# Patient Record
Sex: MALE | Race: WHITE | Employment: FULL TIME | URBAN - METROPOLITAN AREA
[De-identification: names, ages, dates, MRNs, and addresses within clinical notes are randomized per-mention and may not be internally consistent; named-entity substitution may affect disease eponyms.]

---

## 2019-11-22 ENCOUNTER — APPOINTMENT (OUTPATIENT)
Dept: PHYSICAL THERAPY | Age: 59
End: 2019-11-22

## 2019-11-25 ENCOUNTER — HOSPITAL ENCOUNTER (OUTPATIENT)
Dept: PHYSICAL THERAPY | Age: 59
Discharge: HOME | End: 2019-11-25
Payer: COMMERCIAL

## 2019-11-25 PROCEDURE — 97110 THERAPEUTIC EXERCISES: CPT

## 2019-11-25 PROCEDURE — 97140 MANUAL THERAPY 1/> REGIONS: CPT

## 2019-11-25 PROCEDURE — 97161 PT EVAL LOW COMPLEX 20 MIN: CPT

## 2019-11-25 NOTE — PROGRESS NOTES
PHYSICAL THERAPY - DAILY TREATMENT NOTE    Patient Name: Verner Crofts        Date: 2019  : 1960   yes Patient  Verified  Visit #:   1     Insurance: Payor: Jermaine Rollins / Plan: [id number] / Product Type: HMO /      In time: 4:08 Out time: 5:00   Total Treatment Time: 52     Medicare/[employer] Time Tracking (below)   Total Timed Codes (min):  n/a 1:1 Treatment Time:  n/a     TREATMENT AREA =  Neck pain [M54.2]    SUBJECTIVE  Pain Level (on 0 to 10 scale):  4  / 10   Medication Changes/New allergies or changes in medical history, any new surgeries or procedures?    no  If yes, update Summary List   Subjective Functional Status/Changes:  []  No changes reported     See POC          OBJECTIVE    See exam on chart for details on objective findings          12 min Therapeutic Exercise:  [x]  See flow sheet   Rationale:      increase ROM and increase strength to improve the patients ability to change/maintain body positions for driving     15 min Manual Therapy: STM and TpR to L lats, teres minor, MT and c/s paraspinals/scalenes. Rationale:      decrease pain, increase ROM and increase tissue extensibility to improve patient's ability to change/maintain body positions for driving        Billed With/As:   [x] TE   [] TA   [] Neuro   [] Self Care Patient Education: [x] Review HEP    [] Progressed/Changed HEP based on:   [] positioning   [] body mechanics   [] transfers   [] heat/ice application    [x] other: pt ed on neutral sitting/standing head posture with postural corrections throughout the day with sx's.        Other Objective/Functional Measures:    See POC     Post Treatment Pain Level (on 0 to 10) scale:   3  / 10     ASSESSMENT  Assessment/Changes in Function:     See POC     []  See Progress Note/Recertification   Patient will continue to benefit from skilled PT services to modify and progress therapeutic interventions, address functional mobility deficits, address ROM deficits, address strength deficits, analyze and address soft tissue restrictions, analyze and cue movement patterns, analyze and modify body mechanics/ergonomics, assess and modify postural abnormalities and instruct in home and community integration to attain remaining goals. Progress toward goals / Updated goals:    See POC     PLAN  []  Upgrade activities as tolerated yes Continue plan of care   []  Discharge due to :    []  Other:      Therapist: Valentino Alejandra.  Garcia Mojica PT    Date: 11/25/2019 Time: 11:29 AM     Future Appointments   Date Time Provider Juan Franco   12/9/2019  6:00 PM Jana MCMILLAN PT [facility]   12/10/2019  8:30 AM Fany Caballero, PTA [facility]   12/16/2019  4:15 PM Jana MCMILLAN PT [facility]   12/18/2019  4:15 PM Fany Caballero, PTA [facility]   12/23/2019  4:30 PM Fany Caballero, PTA [facility]

## 2019-11-25 NOTE — PROGRESS NOTES
[facility] [place] [address], [place], [address]  Phone: [phone]   [phone]  PLAN OF CARE / [address] [place] [address] PHYSICAL THERAPY SERVICES  Patient Name: Jian Tyler : 1960   Medical   Diagnosis: Neck pain [M54.2] Treatment Diagnosis: Neck pain [M54.2]   Onset Date: 2 months     Referral Source: Gisselle Butcher MD Start of Care [facility]): 2019   Prior Hospitalization: See medical history Provider #: [id number]   Prior Level of Function: Unlimited tolerance for sitting, driving and desk work   Comorbidities: PMH leukemia (25 years ago), depression   Medications: Verified on Patient Summary List   The Plan of Care and following information is based on the information from the initial evaluation.   ===========================================================================================  Assessment / key information:  Pt is a 64y.o. year old RHD male with subjective complaints of L shoulder/neck pain with insidious onset; although started around the time of receiving a flu shot. Pain has stayed the same since onset. Current pain is rated as 0 to 7/10; localized to posterior shoulder blade on left and radiating laterally to posterior upper arm to elbow level at worse. Worse with sitting, desk work, driving. Denies red flags. Pt has recent xrays (+) DDD and mild anterolisthesis C5-C6. FOTO score= 51/100 indicating 49% impairment to functional activities. Today's evaulation is significant for   1) POSTURE/OBSERVATION: flattened c/s lordosis. 2) ROM c/s grossly WNL all planes with p! Reproduction in R SB'ing. UE ROM grossly WNL except L shoulder IR limited to 48 degrees and also reproduces pain c/o.  3) STRENGTH:  RUE grossly 5/5, L shoulder grossly 4+/5 with fatigue/tremors noted to testing;  Shoulder ER reproduces posterior shoulder pain c/o; wrist/ strength 5/5.   4) SPECIAL TESTS: cervical compression alleviates pain, cervical traction increases pain. (+) L median ULTT . 5) ADDITIONAL FINDINGS: TrP's and ttp to L Lats, teres minor, MT and b/l c/s paraspinals and scalenes. These findings are supportive for diagnosis of cervical radiculopathy. Pt will be a good candidate for skilled PT to address these impairments and promote return to normal ADLs and functional mobility for improved quality of life.    ===========================================================================================  Eval Complexity: History MEDIUM  Complexity : 1-2 comorbidities / personal factors will impact the outcome/ POC ;  Examination  MEDIUM Complexity : 3 Standardized tests and measures addressing body structure, function, activity limitation and / or participation in recreation ; Presentation LOW Complexity : Stable, uncomplicated ;  Decision Making MEDIUM Complexity : FOTO score of 26-74; Overall Complexity LOW   Problem List: pain affecting function, decrease ROM, decrease strength, decrease ADL/ functional abilitiies, decrease activity tolerance and decrease flexibility/ joint mobility   Treatment Plan may include any combination of the following: Therapeutic exercise, Therapeutic activities, Neuromuscular re-education, Physical agent/modality, Manual therapy, Patient education and Self Care training  Patient / Family readiness to learn indicated by: asking questions, trying to perform skills and interest  Persons(s) to be included in education: patient (P)  Barriers to Learning/Limitations: None  Measures taken, if barriers to learning:    Patient Goal (s): \"be able to drive eight hours without pain\"   Patient self reported health status: good  Rehabilitation Potential: good   Short Term Goals: To be accomplished in  4  treatments:  1. Pt will be educated in appropriate HEP to decrease pain, increase ROM, increase strength and return pt to PLOF.     2. Pt will report at least 25% improvement in frequency/intensity of left UE radicular symptoms with ADL's.   3. Pt will note pain less than or equal to 5/10 at worst to allow increase in functional abilities.  Long Term Goals: To be accomplished in  8-12  treatments:  1. Pt will improve FOTO score to >/= 67 to demo a significant improvement in functional activity tolerance. 2. Pt will achieve >/= +5 GROC in order to promote increased activity tolerance. 3. Pt will demonstrate c/s AROM WNL and pain free to promote improved driving tolerance. 4. Patient will report at least 75% functional improvement with tolerance for desk work. Frequency / Duration:   Patient to be seen  1-2  times per week for 8-12  treatments:  Patient / Caregiver education and instruction: self care, activity modification and exercises  Therapist Signature: Ender Solorio, PT Date: [id number]   Certification Period: n/a Time: 4:51 PM   ===========================================================================================  I certify that the above Physical Therapy Services are being furnished while the patient is under my care. I agree with the treatment plan and certify that this therapy is necessary. Physician Signature:        Date:       Time:     Please sign and return to [facility] or you may fax the signed copy to [phone]. Thank you.

## 2019-12-09 ENCOUNTER — APPOINTMENT (OUTPATIENT)
Dept: PHYSICAL THERAPY | Age: 59
End: 2019-12-09
Payer: COMMERCIAL

## 2019-12-10 ENCOUNTER — HOSPITAL ENCOUNTER (OUTPATIENT)
Dept: PHYSICAL THERAPY | Age: 59
Discharge: HOME | End: 2019-12-10
Payer: COMMERCIAL

## 2019-12-10 PROCEDURE — 97140 MANUAL THERAPY 1/> REGIONS: CPT

## 2019-12-10 PROCEDURE — 97110 THERAPEUTIC EXERCISES: CPT

## 2019-12-10 NOTE — PROGRESS NOTES
PT DAILY TREATMENT NOTE     Patient Name: Carlos Dewitt  Date:12/10/2019  : 1960  [x]  Patient  Verified  Payor: Magdiel Brown / Plan: [employer] / Product Type: HMO /    In time:8:31  Out time:9:23  Total Treatment Time (min): 52  Total Timed Codes (min): 49  1:1 Treatment Time (min):    Visit #: 2 of     Treatment Area: Neck pain [M54.2]    SUBJECTIVE  Pain Level (0-10 scale): 2/10  Any medication changes, allergies to medications, adverse drug reactions, diagnosis change, or new procedure performed?: [x] No    [] Yes (see summary sheet for update)  Subjective functional status/changes:   [] No changes reported  I usually feel the pain from my neck that goes down the back of my arm to my elbow and it gets worse the longer that I drive as well as using the computer. OBJECTIVE      40 min Therapeutic Exercise:  [x] See flow sheet :   Rationale: increase ROM and increase strength to improve the patients ability to improve functional abilities    12 min Manual Therapy:  SOR and Instrument assisted soft tissue mobilization applied to left cervical musculature using GT-3,4&6   Rationale: decrease pain, increase ROM, increase tissue extensibility and decrease trigger points to improve functional mobility         min Patient Education: [x] Review HEP    [] Progressed/Changed HEP based on:   [] positioning   [] body mechanics   [] transfers   [] heat/ice application        Other Objective/Functional Measures:     Pain Level (0-10 scale) post treatment: 210    ASSESSMENT/Changes in Function: Pt tolerated all new therex fairly well upon trial today except for UBE which exacerbated left UE radicular symptoms which was halted at that point. Pt presented with chief c/o increased left UE radicular symptoms down posterior triceps region to elbow especially increased with prolonged sitting combined with reaching ADL's.  Pt did not demonstrate any apparent directional preference during treatment today.Will continue to progress/advance patient within current POC as tolerated with monitoring symptoms. Patient will continue to benefit from skilled PT services to modify and progress therapeutic interventions, address functional mobility deficits, address ROM deficits, address strength deficits, analyze and address soft tissue restrictions, analyze and cue movement patterns, analyze and modify body mechanics/ergonomics and assess and modify postural abnormalities to attain remaining goals. []  See Plan of Care  []  See progress note/recertification  []  See Discharge Summary         Progress towards goals / Updated goals: · Short Term Goals: To be accomplished in  4  treatments:  1. Pt will be educated in appropriate HEP to decrease pain, increase ROM, increase strength and return pt to PLOF.12/10/19: Met  2. Pt will report at least 25% improvement in frequency/intensity of left UE radicular symptoms with ADL's.   3. Pt will note pain less than or equal to 5/10 at worst to allow increase in functional abilities.      · Long Term Goals: To be accomplished in  8-12  treatments:  1. Pt will improve FOTO score to >/= 67 to demo a significant improvement in functional activity tolerance. 2. Pt will achieve >/= +5 GROC in order to promote increased activity tolerance. 3. Pt will demonstrate c/s AROM WNL and pain free to promote improved driving tolerance. 4. Patient will report at least 75% functional improvement with tolerance for desk work.     PLAN  [x]  Upgrade activities as tolerated     []  Continue plan of care  []  Update interventions per flow sheet       []  Discharge due to:_  []  Other:_      Rossy Manning PTA 12/10/2019  8:37 AM      Future Appointments   Date Time Provider Juan Franco   12/16/2019  4:15 PM Dany West, PT [facility]   12/18/2019  4:15 PM Fany Caballero PTA [facility]   12/23/2019  4:30 PM Fany Caballero PTA [facility]

## 2019-12-16 ENCOUNTER — HOSPITAL ENCOUNTER (OUTPATIENT)
Dept: PHYSICAL THERAPY | Age: 59
Discharge: HOME | End: 2019-12-16
Payer: COMMERCIAL

## 2019-12-16 PROCEDURE — 97140 MANUAL THERAPY 1/> REGIONS: CPT

## 2019-12-16 PROCEDURE — 97110 THERAPEUTIC EXERCISES: CPT

## 2019-12-16 NOTE — PROGRESS NOTES
PHYSICAL THERAPY - DAILY TREATMENT NOTE    Patient Name: Sakina Vieira        Date: 2019  : 1960   yes Patient  Verified  Visit #:   3   of     Insurance: Payor: Celestino Thomason / Plan: [id number] H[employer] / Product Type: HMO /      In time: 4:21 Out time: 5:01   Total Treatment Time: 40     Medicare/[facility] Time Tracking (below)   Total Timed Codes (min):  na 1:1 Treatment Time:  na     TREATMENT AREA =  Neck pain [M54.2]    SUBJECTIVE  Pain Level (on 0 to 10 scale):  1  / 10   Medication Changes/New allergies or changes in medical history, any new surgeries or procedures?    no  If yes, update Summary List   Subjective Functional Status/Changes:  []  No changes reported     Pt reports sx only arise when he is driving, do not extend below elbow. Reports having ~2 day relief after last visit. OBJECTIVE    25 min Therapeutic Exercise:  [x]  See flow sheet   Rationale:      increase ROM and increase strength to improve the patients ability to drive     15 min Manual Therapy: pec minor release, DTM to the L infraspinatus, scalenes, rhomboids; median nerve glide L   Rationale:      decrease pain, increase ROM, increase tissue extensibility and decrease trigger points to improve patient's ability to complete ADLs      Billed With/As:   [x] TE   [] TA   [] Neuro   [] Self Care Patient Education: [x] Review HEP    [] Progressed/Changed HEP based on:   [] positioning   [] body mechanics   [] transfers   [] heat/ice application    [] other:      Other Objective/Functional Measures:    Reproduction of pt's L UE sx w/ UBE retro, after 8 reps of seated c/s retraction, and TB rows  Sig ttp to the L post cuff, (+) referral to L posterolateral UE  Inc pain w/ attempts at standing shoulder abd; performed 3 reps then discontinued, w/ residual L scapular and posterolateral arm pain following.  Pt declined modalities     Post Treatment Pain Level (on 0 to 10) scale:   4  / 10     ASSESSMENT  Assessment/Changes in Function:     Reproduction of UE sx w/ cervical retraction, significant fatigue/tremors noted w/ L post cuff strengthening, suggest possible underlying L RC involvement. Will continue to progress/modify as needed to further improve upon UE strength and pain modulation to improve ADL tolerance     []  See Progress Note/Recertification   Patient will continue to benefit from skilled PT services to modify and progress therapeutic interventions, address functional mobility deficits, address ROM deficits, address strength deficits, analyze and address soft tissue restrictions, analyze and cue movement patterns, analyze and modify body mechanics/ergonomics, assess and modify postural abnormalities and instruct in home and community integration to attain remaining goals. Progress toward goals / Updated goals: · Short Term Goals: To be accomplished in  4  treatments:  1.  Pt will be educated in appropriate HEP to decrease pain, increase ROM, increase strength and return pt to PLOF.12/10/19: Met  2. Pt will report at least 25% improvement in frequency/intensity of left UE radicular symptoms with ADL's.   3. Pt will note pain less than or equal to 5/10 at worst to allow increase in functional abilities.       · Long Term Goals: To be accomplished in  8-12  treatments:  1. Pt will improve FOTO score to >/= 67 to demo a significant improvement in functional activity tolerance. 2. Pt will achieve >/= +5 GROC in order to promote increased activity tolerance. 3. Pt will demonstrate c/s AROM WNL and pain free to promote improved driving tolerance. 4. Patient will report at least 75% functional improvement with tolerance for desk work.      PLAN  []  Upgrade activities as tolerated yes Continue plan of care   []  Discharge due to :    []  Other:      Therapist: Elder Carrasco PT    Date: 12/16/2019 Time: 4:51 PM     Future Appointments   Date Time Provider Juan Franco   12/18/2019  4:15 PM [facility]   12/23/2019  4:30 PM Stacie Yang, [place]O [facility]

## 2019-12-18 ENCOUNTER — HOSPITAL ENCOUNTER (OUTPATIENT)
Dept: PHYSICAL THERAPY | Age: 59
Discharge: HOME | End: 2019-12-18
Payer: COMMERCIAL

## 2019-12-18 PROCEDURE — 97110 THERAPEUTIC EXERCISES: CPT

## 2019-12-18 PROCEDURE — 97140 MANUAL THERAPY 1/> REGIONS: CPT

## 2019-12-18 NOTE — PROGRESS NOTES
PT DAILY TREATMENT NOTE     Patient Name: Loco Gerber  Date:2019  : 1960  [x]  Patient  Verified  Payor: [facility] / Plan: [id number] [place] HMO / Product Type: HMO /    In time:4:15  Out time:5:08  Total Treatment Time (min): 53  Total Timed Codes (min): 48  1:1 Treatment Time (min):    Visit #: 4 of     Treatment Area: Neck pain [M54.2]    SUBJECTIVE  Pain Level (0-10 scale): 2/10  Any medication changes, allergies to medications, adverse drug reactions, diagnosis change, or new procedure performed?: [x] No    [] Yes (see summary sheet for update)  Subjective functional status/changes:   [] No changes reported  I still get the pain and tingling down to my elbow after about 10-15 minutes of driving, but it has been feeling pretty good for a little while after I finish the therapy sessions. OBJECTIVE      40 min Therapeutic Exercise:  [x] See flow sheet :   Rationale: increase ROM and increase strength to improve the patients ability to improve functional abilities     13 min Manual Therapy:  SOR/manual traction and Instrument assisted soft tissue mobilization applied to left cervical and medial scapular border musculature using GT-1&3   Rationale: decrease pain, increase ROM, increase tissue extensibility and decrease trigger points to improve functional mobility        min Patient Education: [x] Review HEP    [] Progressed/Changed HEP based on:   [] positioning   [] body mechanics   [] transfers   [] heat/ice application        Other Objective/Functional Measures:     Pain Level (0-10 scale) post treatment: 0    ASSESSMENT/Changes in Function: Pt presented with increased relief of left cervical and proximal UE pain/symptoms with trial with manual SOR/manual traction and repeated left side bending followed by cervical retraction suggesting possible disc derangement.  Pt was also able to advance to addition of theraband lower trap walkouts and corkscrew stretches with min to mod challenge today.Will continue to progress/advance patient within current POC as tolerated with monitoring symptoms. Patient will continue to benefit from skilled PT services to modify and progress therapeutic interventions, address functional mobility deficits, address ROM deficits, address strength deficits, analyze and address soft tissue restrictions, analyze and cue movement patterns, analyze and modify body mechanics/ergonomics and assess and modify postural abnormalities to attain remaining goals. []  See Plan of Care  []  See progress note/recertification  []  See Discharge Summary         Progress towards goals / Updated goals: · Short Term Goals: To be accomplished in  4  treatments:  1.  Pt will be educated in appropriate HEP to decrease pain, increase ROM, increase strength and return pt to PLOF.12/10/19: Met  2. Pt will report at least 25% improvement in frequency/intensity of left UE radicular symptoms with ADL's.   3. Pt will note pain less than or equal to 5/10 at worst to allow increase in functional abilities.       · Long Term Goals: To be accomplished in  8-12  treatments:  1. Pt will improve FOTO score to >/= 67 to demo a significant improvement in functional activity tolerance. 2. Pt will achieve >/= +5 GROC in order to promote increased activity tolerance. 3. Pt will demonstrate c/s AROM WNL and pain free to promote improved driving tolerance. 4. Patient will report at least 75% functional improvement with tolerance for desk work.     PLAN  [x]  Upgrade activities as tolerated     []  Continue plan of care  []  Update interventions per flow sheet       []  Discharge due to:_  []  Other:_      Alston Lesches, PTA 12/18/2019  4:13 PM      Future Appointments   Date Time Provider Juan Franco   12/18/2019  4:15 PM Andreas Vaca [address] [facility]   12/23/2019  4:30 PM Jarrod Garcia PTA [facility]

## 2019-12-23 ENCOUNTER — HOSPITAL ENCOUNTER (OUTPATIENT)
Dept: PHYSICAL THERAPY | Age: 59
Discharge: HOME | End: 2019-12-23
Payer: COMMERCIAL

## 2019-12-23 PROCEDURE — 97110 THERAPEUTIC EXERCISES: CPT

## 2019-12-23 PROCEDURE — 97140 MANUAL THERAPY 1/> REGIONS: CPT

## 2019-12-23 NOTE — PROGRESS NOTES
[address] [facility] AT [place] [address], [place], [address]  Phone: [phone]   [phone]  PROGRESS NOTE  Patient Name: Katiana Hernandez : 1960   Treatment/Medical Diagnosis: Neck pain [M54.2]   Referral Source: Prashant Haley MD     Date of Initial Visit: 19 Attended Visits: 5 Missed Visits: 0     SUMMARY OF TREATMENT  Therapeutic exercise for cervical mobility, postural awareness/strengthening, cervicothoracic stabilization, manual intervention, moist heat and HEP. CURRENT STATUS  Patient reports approximately 20% overall improvement from therapy since initial evaluation with 3/10 pain level on average, increased to 7/10 at the worst with prolonged driving > 53-48 minutes. Pt is making slow but steady progress with gaining cervical mobility as well as advancing with cervical/postural strengthening and cervicothoracic stabilization with current POC. Pt would benefit from continued therapy for 3-7 remaining visits left on current script with advancing as tolerated. Will continue to progress/advance patient within current POC as tolerated with monitoring symptoms. Cervical AROM= Flexion= 42 degrees; Extension=44 degrees; Side bending= 33 degrees bilaterally; Rotation= right=70 degrees, left=75 degrees    Goal/Measure of Progress Goal Met? 1. Pt will be educated in appropriate HEP to decrease pain, increase ROM, increase strength and return pt to PLOF. Status at last Eval: Progressing  Current Status: Met yes   2. Pt will report at least 25% improvement in frequency/intensity of left UE radicular symptoms with ADL's. Status at last Eval: Progressing  Current Status: No improvement reported as of yet due to increased left UE radicular symptoms at the same level with driving activity no   3. Pt will note pain less than or equal to 5/10 at worst to allow increase in functional abilities.    Status at last Eval: increased to 7/10 at the worst with prolonged driving > 27-12 minutes Current Status: increased to 7/10 at the worst with prolonged driving > 41-51 minutes no   4. Pt will improve FOTO score to >/= 67 to demo a significant improvement in functional activity tolerance. Status at last Eval: 51/100 Current Status: 65/100 no     Goal/Measure of Progress Goal Met? 5. Pt will achieve >/= +5 GROC in order to promote increased activity tolerance. Status at last Eval: Progressing  Current Status:  +2 no   6.  . Pt will demonstrate c/s AROM WNL and pain free to promote improved driving tolerance. Status at last Eval: ROM c/s grossly WNL all planes with p! Reproduction in R SB'ing Current Status: ROM c/s grossly WNL all planes with p! Reproduction in R SB'ing no   7. Patient will report at least 75% functional improvement with tolerance for desk work. Status at last Eval: Progressing  Current Status: 10% improvement reported  no       New Goals to be achieved in 3-7 treatments:  1. Pt will report at least 25% improvement in frequency/intensity of left UE radicular symptoms with ADL's.    2.  Pt will note pain less than or equal to 5/10 at worst to allow increase in functional abilities. 3.  Pt will improve FOTO score to >/= 67 to demo a significant improvement in functional activity tolerance. 4.  Pt will achieve >/= +5 GROC in order to promote increased activity tolerance. 5.  Pt will demonstrate c/s AROM WNL and pain free to promote improved driving tolerance   6. Patient will report at least 75% functional improvement with tolerance for desk work. RECOMMENDATIONS  Continue with current POC for 3-7 remaining visits left on current script with advancing as tolerated, then reassess for the need for continuation or discharge from therapy. If you have any questions/comments please contact us directly at [phone]. Thank you for allowing us to assist in the care of your patient.   LPTA Signature: Malissa Valdez, PTA  Date: 12/23/2019   PT Signature: FILI Ivan Time: 4:49 PM   NOTE TO PHYSICIAN:  PLEASE COMPLETE THE ORDERS BELOW AND FAX TO   [facility] at [facility] UNIT: [phone]. If you are unable to process this request in 24 hours please contact our office: [phone].    ___ I have read the above report and request that my patient continue as recommended.   ___ I have read the above report and request that my patient continue therapy with the following changes/special instructions:_________________________________________________________   ___ I have read the above report and request that my patient be discharged from therapy.      Physician Signature:        Date:       Time:

## 2019-12-23 NOTE — PROGRESS NOTES
PT DAILY TREATMENT NOTE     Patient Name: Naldo Harper  Date:2019  : 1960  [x]  Patient  Verified  Payor: Napoleon Wolfe / Plan: [employer] / Product Type: HMO /    In time:4:26  Out time:5:48  Total Treatment Time (min): 82  Total Timed Codes (min): 67  1:1 Treatment Time (min): 79   Visit #: 5 of     Treatment Area: Neck pain [M54.2]    SUBJECTIVE  Pain Level (0-10 scale): 210  Any medication changes, allergies to medications, adverse drug reactions, diagnosis change, or new procedure performed?: [x] No    [] Yes (see summary sheet for update)  Subjective functional status/changes:   [] No changes reported  My neck has been feeling pretty good, I mainly just feel it down in the inside of my shoulder blade lately.     OBJECTIVE  Modality rationale: decrease pain and increase tissue extensibility to improve the patients ability to improve functional abilities   Min Type Additional Details    [] Estim: []Att   []Unatt  []TENS instruct                 []IFC  []Premod []NMES                       []Other:  []w/US   []w/ice   []w/heat  Position:  Location:    []  Traction: [] Cervical       []Lumbar                       [] Prone          []Supine                       []Intermittent   []Continuous Lbs:  [] before manual  [] after manual    []  Ultrasound: []Continuous   [] Pulsed                           []1MHz   []3MHz Location:  W/cm2:    []  Iontophoresis with dexamethasone         Location: [] Take home patch   [] In clinic   10 []  Ice     [x]  heat  []  Ice massage Position:seated  Location:cervical    []  Vasopneumatic Device Pressure: [] lo [] med [] hi   Temp: [] lo [] med [] hi   [] Skin assessment post-treatment:  []intact []redness- no adverse reaction       []redness  adverse reaction:       60 min Therapeutic Exercise:  [x] See flow sheet :   Rationale: increase ROM and increase strength to improve the patients ability to improve functional abilities    12 min Manual Therapy:   Instrument assisted soft tissue mobilization applied to left cervical and medial scapular border musculature using GT-1,3&6   Rationale: decrease pain, increase ROM, increase tissue extensibility and decrease trigger points to improve functional mobility            min Patient Education: [x] Review HEP    [] Progressed/Changed HEP based on:   [] positioning   [] body mechanics   [] transfers   [] heat/ice application        Other Objective/Functional Measures:     Pain Level (0-10 scale) post treatment: 2/10    ASSESSMENT/Changes in Function:     Patient will continue to benefit from skilled PT services to modify and progress therapeutic interventions, address functional mobility deficits, address ROM deficits, address strength deficits, analyze and address soft tissue restrictions, analyze and cue movement patterns, analyze and modify body mechanics/ergonomics and assess and modify postural abnormalities to attain remaining goals. []  See Plan of Care  [x]  See progress note/recertification  []  See Discharge Summary         Progress towards goals / Updated goals:  See Progress note/Physician update for full detailed progress towards established goals. PLAN  [x]  Upgrade activities as tolerated     []  Continue plan of care  []  Update interventions per flow sheet       []  Discharge due to:_  []  Other:_      Sis Marshall, PTA 12/23/2019  4:44 PM      No future appointments.

## 2020-01-30 NOTE — PROGRESS NOTES
[address] AT [place] [address], [place], [address]  Phone: [phone]   [phone]  DISCHARGE SUMMARY  Patient Name: Terrell Shepard : 1960   Treatment/Medical Diagnosis: Neck pain [M54.2]   Referral Source: Dg Cortez MD     Date of Initial Visit: 19 Attended Visits: 5 Missed Visits: 0     SUMMARY OF TREATMENT  Therapeutic exercise for cervical mobility, postural awareness/strengthening, cervicothoracic stabilization, manual intervention, moist heat and HEP. CURRENT STATUS  Patient reports approximately 20% overall improvement from therapy since initial evaluation with 3/10 pain level on average, increased to 7/10 at the worst with prolonged driving > 28-56 minutes. Pt has made slow but steady progress with gaining cervical mobility as well as advancing with cervical/postural strengthening and cervicothoracic stabilization with current POC. Cervical AROM= Flexion= 42 degrees; Extension=44 degrees; Side bending= 33 degrees bilaterally; Rotation= right=70 degrees, left=75 degrees    Goal/Measure of Progress Goal Met? 1. Pt will be ed. in appropriate HEP to decrease pain, increase ROM, increase strength and return pt to PLOF. Status at last Eval: Progressing  Current Status: Met yes   2. Pt will report at least 25% improvement in frequency/intensity of left UE radicular symptoms with ADL's. Status at last Eval: Progressing  Current Status: No improvement reported as of yet due to increased L UE radicular sx's at the same level with driving activity no   3. Pt will note pain less than or equal to 5/10 at worst to allow increase in functional abilities. Status at last Eval: Max 7/10 with prolonged driving > 38-69 min. Current Status: max 7/10 with prolonged driving > 88-62 minutes no   4. Pt will improve FOTO score to >/= 67 to demo a significant improvement in functional activity tolerance.    Status at last Eval: 51/100 Current Status: 65/100 no              Goal/Measure of Progress Goal Met? 5. Pt will achieve >/= +5 GROC in order to promote increased activity tolerance. Status at last Eval: Progressing  Current Status:  +2 no   6.  . Pt will demonstrate c/s AROM WNL and pain free to promote improved driving tolerance. Status at last Eval: ROM c/s grossly WNL all planes with p! Reproduction in R SB'ing Current Status: ROM c/s grossly WNL all planes with p! Reproduction in R SB'ing no   7. Patient will report at least 75% functional improvement with tolerance for desk work. Status at last Eval: Progressing  Current Status: 10% improvement reported  no     RECOMMENDATIONS  Pt elected to self discharge from therapy at this time due to feeling that he has achieved maximum medical benefit/potential from current POC after attending 5 of 8-12 prescribed visits. If you have any questions/comments please contact us directly at [phone]. Thank you for allowing us to assist in the care of your patient.     LPTA Signature: John Retana PTA Date: 1/30/20   Therapist Signature: FILI Lawrence Time: 8:22 AM

## 2022-05-02 ENCOUNTER — FOLLOW UP (OUTPATIENT)
Dept: URBAN - METROPOLITAN AREA CLINIC 1 | Facility: CLINIC | Age: 62
End: 2022-05-02

## 2022-05-02 DIAGNOSIS — H40.1131: ICD-10-CM

## 2022-05-02 DIAGNOSIS — H25.813: ICD-10-CM

## 2022-05-02 PROCEDURE — 92012 INTRM OPH EXAM EST PATIENT: CPT

## 2022-05-02 PROCEDURE — 92015 DETERMINE REFRACTIVE STATE: CPT

## 2022-05-02 ASSESSMENT — TONOMETRY
OD_IOP_MMHG: 11
OS_IOP_MMHG: 11

## 2022-05-02 ASSESSMENT — VISUAL ACUITY
OS_BAT: 20/100
OS_CC: 20/40
OD_BAT: 20/100
OD_CC: 20/25

## 2022-05-02 NOTE — PATIENT DISCUSSION
(CD: 0.60) IOP stable at 11 today. Goal range 14-16 OU. Continue Lumigan QHS OU. Patient to continue with current gtt regimen. Patient advised to be compliant with gtts. Condition was discussed with patient and patient understands. Will continue to monitor patient for any progression in condition. Patient was advised to call us with any problems, questions, or concerns.

## 2022-11-07 ENCOUNTER — COMPREHENSIVE EXAM (OUTPATIENT)
Dept: URBAN - METROPOLITAN AREA CLINIC 1 | Facility: CLINIC | Age: 62
End: 2022-11-07

## 2022-11-07 DIAGNOSIS — H25.813: ICD-10-CM

## 2022-11-07 DIAGNOSIS — H40.1131: ICD-10-CM

## 2022-11-07 DIAGNOSIS — H04.123: ICD-10-CM

## 2022-11-07 PROCEDURE — 92133 CPTRZD OPH DX IMG PST SGM ON: CPT

## 2022-11-07 PROCEDURE — 92014 COMPRE OPH EXAM EST PT 1/>: CPT

## 2022-11-07 ASSESSMENT — VISUAL ACUITY
OS_BAT: 20/100
OD_BAT: 20/100
OS_CC: 20/20
OD_CC: 20/20
OD_CC: J1+
OS_CC: J1+

## 2022-11-07 ASSESSMENT — TONOMETRY
OD_IOP_MMHG: 14
OS_IOP_MMHG: 14

## 2022-11-07 NOTE — PATIENT DISCUSSION
(CD: 0.60 OU) IOP stable at 14 OU today. Goal range 14-16 OU. OCT performed today shows temporal thinning but stable OU. Continue Lumigan QHS OU. Patient to continue with current gtt regimen. Patient advised to be compliant with gtts. Condition was discussed with patient and patient understands. Will continue to monitor patient for any progression in condition. Patient was advised to call us with any problems, questions, or concerns.

## 2022-11-07 NOTE — PATIENT DISCUSSION
Visually Significant (secondary to glare), discussed the risks, benefits, alternatives, and limitations of cataract surgery. Observe for now without intervention. The patient was advised to contact office with any change or worsening of vision.

## 2023-08-24 ENCOUNTER — COMPREHENSIVE EXAM (OUTPATIENT)
Dept: URBAN - METROPOLITAN AREA CLINIC 1 | Facility: CLINIC | Age: 63
End: 2023-08-24

## 2023-08-24 DIAGNOSIS — H25.813: ICD-10-CM

## 2023-08-24 DIAGNOSIS — E11.9: ICD-10-CM

## 2023-08-24 DIAGNOSIS — H40.1131: ICD-10-CM

## 2023-08-24 PROCEDURE — 92015 DETERMINE REFRACTIVE STATE: CPT

## 2023-08-24 PROCEDURE — 92014 COMPRE OPH EXAM EST PT 1/>: CPT

## 2023-08-24 PROCEDURE — 92083 EXTENDED VISUAL FIELD XM: CPT

## 2023-08-24 RX ORDER — BIMATOPROST 0.1 MG/ML: 1 SOLUTION/ DROPS OPHTHALMIC EVERY EVENING

## 2023-08-24 ASSESSMENT — VISUAL ACUITY
OS_CC: 20/20
OD_CC: 20/20
OD_BAT: 20/100
OS_BAT: 20/100

## 2023-08-24 ASSESSMENT — TONOMETRY
OS_IOP_MMHG: 16
OD_IOP_MMHG: 16

## 2023-12-12 ENCOUNTER — FOLLOW UP (OUTPATIENT)
Dept: URBAN - METROPOLITAN AREA CLINIC 1 | Facility: CLINIC | Age: 63
End: 2023-12-12

## 2023-12-12 DIAGNOSIS — H40.1131: ICD-10-CM

## 2023-12-12 PROCEDURE — 92133 CPTRZD OPH DX IMG PST SGM ON: CPT

## 2023-12-12 PROCEDURE — 92012 INTRM OPH EXAM EST PATIENT: CPT

## 2023-12-12 ASSESSMENT — TONOMETRY
OD_IOP_MMHG: 17
OS_IOP_MMHG: 17

## 2023-12-12 ASSESSMENT — VISUAL ACUITY
OD_CC: 20/20-1
OS_CC: 20/20

## 2024-04-23 ENCOUNTER — FOLLOW UP (OUTPATIENT)
Dept: URBAN - METROPOLITAN AREA CLINIC 1 | Facility: CLINIC | Age: 64
End: 2024-04-23

## 2024-04-23 DIAGNOSIS — H40.1131: ICD-10-CM

## 2024-04-23 PROCEDURE — 92012 INTRM OPH EXAM EST PATIENT: CPT

## 2024-04-23 ASSESSMENT — TONOMETRY
OS_IOP_MMHG: 16
OD_IOP_MMHG: 16

## 2024-04-23 ASSESSMENT — VISUAL ACUITY
OS_CC: J1+
OS_CC: 20/20
OD_CC: J1+
OD_CC: 20/20

## 2024-09-30 NOTE — THERAPY EVALUATION
fingertips to patella (p!), Ext 10% (p!). SB R/L fingertips to distal thighs (p!).  ROT R 75% (p!), L 50% (p!)   HIPS:  Ext (in sidelying) R 7 deg, L -5 deg. IR (in prone) R 2 deg, L 0 deg.  ER (in prone) R WNL, L WNL    STRENGTH   HIP: ext R 5/5, L 5/5; abd R 4-/5, L 5/5.  IR b/l 5/5.  ER R 5/5, L 4+/5  KNEE: Ext R 5/5, L 5/5; flex R 5/5, L 5/5    SPECIAL TESTS:  (+) SLR ~30 deg for LBP   (+) 90-90 hams: R 49 (p!), L 47 (p!)    ADDITIONAL FINDINGS:   PALPATION: significant hypertonicity R>L lumbar paraspinals, QL, upper glutes.   PIVM: Grade III P/A l/s    These findings are supportive for diagnosis of lumbago.  Pt will be a good candidate for skilled PT to address these impairments and promote return to normal ADLs and functional mobility for improved quality of life.     Not post operative    Evaluation Complexity:  History:  HIGH Complexity :3+ comorbidities / personal factors will impact the outcome/ POC ; Examination:  MEDIUM Complexity : 3 Standardized tests and measures addressin body structure, function, activity limitation and / or participation in recreation  ;Presentation:  LOW Complexity : Stable, uncomplicated  ;Clinical Decision Making: Oswestry Disability Index (CASPER) for Low Back Pain = 52 % ; (> 41% Severe Disability) = HIGH Complexity  Overall Complexity Rating: LOW   Problem List: pain affecting function, decrease ROM, decrease strength, decrease ADL/functional abilities, decrease activity tolerance, and decrease flexibility/joint mobility   Treatment Plan may include any combination of the followin Therapeutic Exercise, 03253 Neuromuscular Re-Education, 31903 Manual Therapy, 67528 Therapeutic Activity, 06534 Self Care/Home Management, 65926 Electrical Stim unattended, 46807 Gait Training, and 94575 Mechanical Traction  Patient / Family readiness to learn indicated by: asking questions, trying to perform skills, and interest  Persons(s) to be included in education: patient (P)  Barriers to

## 2024-10-02 ENCOUNTER — HOSPITAL ENCOUNTER (OUTPATIENT)
Facility: HOSPITAL | Age: 64
Setting detail: RECURRING SERIES
Discharge: HOME | End: 2024-10-05
Payer: COMMERCIAL

## 2024-10-02 PROCEDURE — 97161 PT EVAL LOW COMPLEX 20 MIN: CPT

## 2024-10-02 PROCEDURE — 97535 SELF CARE MNGMENT TRAINING: CPT

## 2024-10-02 NOTE — PROGRESS NOTES
PHYSICAL / OCCUPATIONAL THERAPY - DAILY TREATMENT NOTE (updated )  For Eval visit    Patient Name: Ganga Schroeder    Date: 10/2/2024    : 1960  Insurance: Payor: [employer] / Plan: [employer] / Product Type: *No Product type* /      Patient  verified yes     Visit #   Current / Total 1 16   Time   In / Out 8:22 9:00   Pain   In / Out 5 4   Subjective Functional Status/Changes: See POC     TREATMENT AREA =  see POC    OBJECTIVE       30 min   Eval - untimed                      Therapeutic Procedures:    Tx Min Billable or 1:1 Min (if diff from Tx Min) Procedure, Rationale, Specifics   8  93810 Self Care/Home Management (timed):  improve patient knowledge and understanding of activity modification, diagnosis/prognosis, and physical therapy expectations, procedures and progression  to improve patient's ability to progress to PLOF and address remaining functional goals.  (see flow sheet as applicable)     Details if applicable:    -pt ed to avoid overuse of back brace to decrease risk for disuse atrophy  -pt ed and demo good tolerance for/understanding of HEP as per chart copy; pt instructed on use of [employer]          Details if applicable:            Details if applicable:            Details if applicable:     8  [facility] Totals Reminder: bill using total billable min of TIMED therapeutic procedures (example: do not include dry needle or estim unattended, both untimed codes, in totals to left)  8-22 min = 1 unit; 23-37 min = 2 units; 38-52 min = 3 units; 53-67 min = 4 units; 68-82 min = 5 units   Total Total     [x]  Patient Education billed concurrently with other procedures   [x] Review HEP    [] Progressed/Changed HEP, detail:    [] Other detail:       Objective Information/Functional Measures/Assessment    See POC    Patient will continue to benefit from skilled PT / OT services to modify and progress therapeutic interventions, analyze and address functional mobility deficits,

## 2024-10-07 NOTE — PROGRESS NOTES
PHYSICAL / OCCUPATIONAL THERAPY - DAILY TREATMENT NOTE    Patient Name: Ganga Schroeder    Date: 10/8/2024    : 1960  Insurance: Payor: [employer] / Plan: [employer] / Product Type: *No Product type* /      Patient  verified Yes     Visit #   Current / Total 2 16   Time   In / Out 822 903   Pain   In / Out 5-6 5   Subjective Functional Status/Changes: Pt states back feels stiff this morning. Reports compliance with HEP.     TREATMENT AREA =  Other low back pain [M54.59]     OBJECTIVE         Therapeutic Procedures:    Tx Min Billable or 1:1 Min (if diff from Tx Min) Procedure, Rationale, Specifics   23  01380 Therapeutic Exercise (timed):  increase ROM, strength, coordination, balance, and proprioception to improve patient's ability to progress to PLOF and address remaining functional goals. (see flow sheet as applicable)     Details if applicable:       10 10 41177 Neuromuscular Re-Education (timed):  improve balance, coordination, kinesthetic sense, posture, core stability and proprioception to improve patient's ability to develop conscious control of individual muscles and awareness of position of extremities in order to progress to PLOF and address remaining functional goals. (see flow sheet as applicable)     Details if applicable:     8  47279 Self Care/Home Management (timed):  improve patient knowledge and understanding of physical therapy expectations, procedures and progression  to improve patient's ability to progress to PLOF and address remaining functional goals.  (see flow sheet as applicable)     Details if applicable:  Pt education, HEP review and demo          Details if applicable:            Details if applicable:     41 41 [facility] Totals Reminder: bill using total billable min of TIMED therapeutic procedures (example: do not include dry needle or estim unattended, both untimed codes, in totals to left)  8-22 min = 1 unit; 23-37 min = 2 units; 38-52 min = 3 units; 53-67

## 2024-10-08 ENCOUNTER — HOSPITAL ENCOUNTER (OUTPATIENT)
Facility: HOSPITAL | Age: 64
Setting detail: RECURRING SERIES
Discharge: HOME | End: 2024-10-11
Payer: COMMERCIAL

## 2024-10-08 PROCEDURE — 97110 THERAPEUTIC EXERCISES: CPT

## 2024-10-08 PROCEDURE — 97112 NEUROMUSCULAR REEDUCATION: CPT

## 2024-10-08 PROCEDURE — 97535 SELF CARE MNGMENT TRAINING: CPT

## 2024-10-09 NOTE — PROGRESS NOTES
accomplished in 8 WEEKS  1. Pt will improve Oswestry score to </= 32% to demo a significant improvement in functional activity tolerance.  Status at last note/certification: 52%  Current:    2. Pt will improve b/l hip IR to at least 15 deg to decrease l/s strain with functional mobility  Status at last note/certification: IR (in prone) R 2 deg, L 0 deg  Current:     3. Pt will improve b/l 90-90 Hams to </= 30 deg for decreased lumbar strain with bending.  Status at last note/certification: R 49 (p!), L 47 (p!)  Current:   10/10/24: Progressing, added seated HS stretch to improve flexibility  4. Pt will report max pain </= 2/10 with prolonged sitting to decrease impairment with ADL's  Status at last note/certification: max pain 10/10 and limited sitting tolerance  Current:        Next PN/ RC due 11/2/24   Auth due (visit number/ date) (TERRI, 59 visits; 12/31/24)    PLAN  - Continue Plan of Care  - Upgrade activities as tolerated    Roderick Baxter PTA    10/10/2024    6:53 AM  If an interpreting service was utilized for treatment of this patient, the contents of this document represent the material reviewed with the patient via the .     Future Appointments   Date Time Provider Department Center   10/10/2024  8:20 AM Roderick Baxter PTA [facility]   10/15/2024  8:20 AM Roderick Baxter PTA [facility]   10/16/2024  7:00 AM Milena Coronel PT [facility] Sched   10/17/2024  8:20 AM Roderick Baxter PTA [facility]   10/21/2024  8:20 AM Joseph Paul PT [facility]   10/23/2024  4:00 PM Tal Pruett MD [facility] Sched   10/24/2024  8:20 AM Joseph Paul, PT [facility]   10/24/2024  1:40 PM Tod Blandon PA [facility] Sched   10/30/2024  8:20 AM Kira Piña, PT [facility]   11/8/2024  1:45 PM Tal Pruett MD [facility] Sched

## 2024-10-10 ENCOUNTER — HOSPITAL ENCOUNTER (OUTPATIENT)
Facility: HOSPITAL | Age: 64
Setting detail: RECURRING SERIES
Discharge: HOME | End: 2024-10-13
Payer: COMMERCIAL

## 2024-10-10 PROCEDURE — 97110 THERAPEUTIC EXERCISES: CPT

## 2024-10-10 PROCEDURE — 97112 NEUROMUSCULAR REEDUCATION: CPT

## 2024-10-10 PROCEDURE — 97530 THERAPEUTIC ACTIVITIES: CPT

## 2024-10-15 ENCOUNTER — TELEPHONE (OUTPATIENT)
Facility: HOSPITAL | Age: 64
End: 2024-10-15

## 2024-10-16 NOTE — PROGRESS NOTES
PHYSICAL / OCCUPATIONAL THERAPY - DAILY TREATMENT NOTE    Patient Name: Ganga Schroeder    Date: 10/17/2024    : 1960  Insurance: Payor: [employer] / Plan: [employer] / Product Type: *No Product type* /      Patient  verified Yes     Visit #   Current / Total 4 16   Time   In / Out 820 910   Pain   In / Out 10/10 5/10   Subjective Functional Status/Changes: Pt reports increased LBP today that travels down his R leg to his knee. States pain started last night and lasted throughout the night.     TREATMENT AREA =  Other low back pain [M54.59]     OBJECTIVE  Modalities Rationale:     decrease pain and increase tissue extensibility to improve patient's ability to progress to PLOF and address remaining functional goals.    10 min [x] Estim Unattended, type/location: IFC, L/S in semi-reclined long sitting                                     []  w/ice    [x]  w/heat    min [] Estim Attended, type/location:                                     []  w/US     []  w/ice    []  w/heat    []  TENS insruct      min []  Mechanical Traction: type/lbs                   []  pro   []  sup   []  int   []  cont    []  before manual    []  after manual    min []  Ultrasound, settings/location:      min  unbill []  Ice     []  Heat    location/position:     min []  Paraffin,  details:     min []  Vasopneumatic Device, press/temp:     min []  Whirlpool / Fluido:    If using vaso (only need to measure limb vaso being performed on)      pre-treatment girth :       post-treatment girth :       measured at (landmark location) :      min []  Other:    Skin assessment post-treatment:   Intact          Therapeutic Procedures:    Tx Min Billable or 1:1 Min (if diff from Tx Min) Procedure, Rationale, Specifics   25 [phone] [id number] Therapeutic Exercise (timed):  increase ROM, strength, coordination, balance, and proprioception to improve patient's ability to progress to PLOF and address remaining functional goals. (see flow sheet

## 2024-10-17 ENCOUNTER — HOSPITAL ENCOUNTER (OUTPATIENT)
Facility: HOSPITAL | Age: 64
Setting detail: RECURRING SERIES
Discharge: HOME | End: 2024-10-20
Payer: COMMERCIAL

## 2024-10-17 ENCOUNTER — COMPREHENSIVE EXAM (OUTPATIENT)
Dept: URBAN - METROPOLITAN AREA CLINIC 1 | Facility: CLINIC | Age: 64
End: 2024-10-17

## 2024-10-17 DIAGNOSIS — E11.9: ICD-10-CM

## 2024-10-17 DIAGNOSIS — H16.143: ICD-10-CM

## 2024-10-17 DIAGNOSIS — H04.123: ICD-10-CM

## 2024-10-17 DIAGNOSIS — H02.834: ICD-10-CM

## 2024-10-17 DIAGNOSIS — H25.813: ICD-10-CM

## 2024-10-17 DIAGNOSIS — H40.1131: ICD-10-CM

## 2024-10-17 DIAGNOSIS — H02.831: ICD-10-CM

## 2024-10-17 PROCEDURE — 92083 EXTENDED VISUAL FIELD XM: CPT

## 2024-10-17 PROCEDURE — G0283 ELEC STIM OTHER THAN WOUND: HCPCS

## 2024-10-17 PROCEDURE — 97110 THERAPEUTIC EXERCISES: CPT

## 2024-10-17 PROCEDURE — 92015 DETERMINE REFRACTIVE STATE: CPT

## 2024-10-17 PROCEDURE — 97140 MANUAL THERAPY 1/> REGIONS: CPT

## 2024-10-17 PROCEDURE — 92014 COMPRE OPH EXAM EST PT 1/>: CPT

## 2024-10-21 ENCOUNTER — HOSPITAL ENCOUNTER (OUTPATIENT)
Facility: HOSPITAL | Age: 64
Setting detail: RECURRING SERIES
End: 2024-10-21
Payer: COMMERCIAL

## 2024-10-25 ENCOUNTER — PRE-OP/H&P (OUTPATIENT)
Dept: URBAN - METROPOLITAN AREA CLINIC 1 | Facility: CLINIC | Age: 64
End: 2024-10-25

## 2024-10-25 VITALS
SYSTOLIC BLOOD PRESSURE: 155 MMHG | BODY MASS INDEX: 31.37 KG/M2 | DIASTOLIC BLOOD PRESSURE: 94 MMHG | HEIGHT: 68 IN | WEIGHT: 207 LBS

## 2024-10-25 DIAGNOSIS — H25.813: ICD-10-CM

## 2024-10-25 PROCEDURE — 92012 INTRM OPH EXAM EST PATIENT: CPT

## 2024-10-25 PROCEDURE — 92136 OPHTHALMIC BIOMETRY: CPT

## 2024-10-25 PROCEDURE — 92025 CPTRIZED CORNEAL TOPOGRAPHY: CPT | Mod: NC

## 2024-10-29 ENCOUNTER — HOSPITAL ENCOUNTER (OUTPATIENT)
Facility: HOSPITAL | Age: 64
Setting detail: RECURRING SERIES
End: 2024-10-29
Payer: COMMERCIAL

## 2024-10-30 ENCOUNTER — APPOINTMENT (OUTPATIENT)
Facility: HOSPITAL | Age: 64
End: 2024-10-30
Payer: COMMERCIAL

## 2024-11-01 ENCOUNTER — HOSPITAL ENCOUNTER (OUTPATIENT)
Facility: HOSPITAL | Age: 64
Setting detail: RECURRING SERIES
Discharge: HOME | End: 2024-11-04
Payer: COMMERCIAL

## 2024-11-01 PROCEDURE — 97110 THERAPEUTIC EXERCISES: CPT

## 2024-11-01 PROCEDURE — 97535 SELF CARE MNGMENT TRAINING: CPT

## 2024-11-01 PROCEDURE — 97530 THERAPEUTIC ACTIVITIES: CPT

## 2024-11-01 NOTE — PROGRESS NOTES
PHYSICAL / OCCUPATIONAL THERAPY - DAILY TREATMENT NOTE    Patient Name: Ganga Schroeder    Date: 2024    : 1960  Insurance: Payor: [employer] / Plan: [employer] / Product Type: *No Product type* /      Patient  verified Yes     Visit #   Current / Total 5 16   Time   In / Out 900 942   Pain   In / Out 6/10 6/10   Subjective Functional Status/Changes: Pt reports improvement since starting therapy. Reports decreased pain.     TREATMENT AREA =  Other low back pain [M54.59]     OBJECTIVE         Therapeutic Procedures:    Tx Min Billable or 1:1 Min (if diff from Tx Min) Procedure, Rationale, Specifics   24  11007 Therapeutic Activity (timed):  use of dynamic activities replicating functional movements to increase ROM, strength, coordination, balance, and proprioception in order to improve patient's ability to progress to PLOF and address remaining functional goals.  (see flow sheet as applicable)     Details if applicable:  Reassessment, Oswestry     10  22580 Therapeutic Exercise (timed):  increase ROM, strength, coordination, balance, and proprioception to improve patient's ability to progress to PLOF and address remaining functional goals. (see flow sheet as applicable)     Details if applicable:     8  20197 Self Care/Home Management (timed):  improve patient knowledge and understanding of home injury/symptom/pain management, posture/ergonomics, activity modification, and joint protection strategies  to improve patient's ability to progress to PLOF and address remaining functional goals.  (see flow sheet as applicable)     Details if applicable:  Pt education, HEP review and demo          Details if applicable:            Details if applicable:     42  [facility] Totals Reminder: bill using total billable min of TIMED therapeutic procedures (example: do not include dry needle or estim unattended, both untimed codes, in totals to left)  8-22 min = 1 unit; 23-37 min = 2 units; 38-52 min = 3

## 2024-11-01 NOTE — THERAPY RECERTIFICATION
2/10 with prolonged sitting to decrease impairment with ADL's  Status at last note/certification: max pain 10/10 and limited sitting tolerance  Current: 11/1/24: Goal not met but progressing, 8/10 max pain    Payor: [employer] / Plan: [employer] / Product Type: *No Product type* /     Non-Medicare, can change goals, can adjust or add frequency duration, no signature required      New Goals to be achieved in __4__ WEEKS  1.  Pt will be educated in/compliant with appropriate HEP to decrease pain, increase ROM, increase strength and return pt to PLOF.    Status at last note/certification: performs 3-4x/week  Current:   2. Pt will improve lumbar flexion fingertips to mid shins for decreased impairment with bending activities.  Status at last note/certification:  prox shins  Current:     3. Pt will improve Oswestry score to </= 32% to demo a significant improvement in functional activity tolerance.  Status at last note/certification: 34%  Current:    4. Pt will improve b/l 90-90 Hams to </= 30 deg for decreased lumbar strain with bending.  Status at last note/certification: R 45 (p!), L 40 (p!)  Current:     5. Pt will report max pain </= 2/10 with prolonged sitting to decrease impairment with ADL's  Status at last note/certification: 8/10 max pain  Current:     Frequency / Duration:   Patient to be seen   2   times per week for   4    WEEKS    RECOMMENDATIONS  Patient would benefit from the continuation of skilled rehab interventions for functional progress to achieving above stated clinically significant goals.  Continue per initial Plan of Care.    If you have any questions/comments please contact us directly.  Thank you for allowing us to assist in the care of your patient.    Roderick Baxter, PTA       11/1/2024       7:48 AM  AMY LATHAM, PT  11/04/24  1:10 PM

## 2024-11-05 ENCOUNTER — HOSPITAL ENCOUNTER (OUTPATIENT)
Facility: HOSPITAL | Age: 64
Setting detail: RECURRING SERIES
Discharge: HOME | End: 2024-11-08
Payer: COMMERCIAL

## 2024-11-05 PROCEDURE — 97110 THERAPEUTIC EXERCISES: CPT

## 2024-11-05 PROCEDURE — 97530 THERAPEUTIC ACTIVITIES: CPT

## 2024-11-05 NOTE — PROGRESS NOTES
PHYSICAL / OCCUPATIONAL THERAPY - DAILY TREATMENT NOTE    Patient Name: Ganga Schroeder    Date: 2024    : 1960  Insurance: Payor: [employer] / Plan: [employer] HE[employer] / Product Type: *No Product type* /      Patient  verified Yes     Visit #   Current / Total 6 16   Time   In / Out 1216 1252   Pain   In / Out 4/10 3/10   Subjective Functional Status/Changes: Patient reports \"I am feeling suprisingly good today.\" Patient thinks this may be related to coming in a little later in the day than normal, as he has had a chance to walk and move to warm himself up.      TREATMENT AREA =  Other low back pain [M54.59]     OBJECTIVE         Therapeutic Procedures:    Tx Min Billable or 1:1 Min (if diff from Tx Min) Procedure, Rationale, Specifics   25  58778 Therapeutic Exercise (timed):  increase ROM, strength, coordination, balance, and proprioception to improve patient's ability to progress to PLOF and address remaining functional goals. (see flow sheet as applicable)     Details if applicable:       11  23263 Therapeutic Activity (timed):  use of dynamic activities replicating functional movements to increase ROM, strength, coordination, balance, and proprioception in order to improve patient's ability to progress to PLOF and address remaining functional goals.  (see flow sheet as applicable)     Details if applicable:            Details if applicable:            Details if applicable:            Details if applicable:     36  [facility] Totals Reminder: bill using total billable min of TIMED therapeutic procedures (example: do not include dry needle or estim unattended, both untimed codes, in totals to left)  8-22 min = 1 unit; 23-37 min = 2 units; 38-52 min = 3 units; 53-67 min = 4 units; 68-82 min = 5 units   Total Total     [x]  Patient Education billed concurrently with other procedures   [x] Review HEP    [] Progressed/Changed HEP, detail:    [] Other detail:       Objective Information/Functional

## 2024-11-06 NOTE — PROGRESS NOTES
PHYSICAL / OCCUPATIONAL THERAPY - DAILY TREATMENT NOTE    Patient Name: Ganga Schroeder    Date: 2024    : 1960  Insurance: Payor: [employer] / Plan: [employer] HE[employer] / Product Type: *No Product type* /      Patient  verified Yes     Visit #   Current / Total 7 16   Time   In / Out 822 904   Pain   In / Out 3.5/10 3/10   Subjective Functional Status/Changes: Pt reports soreness in his lower back today after doing lots of housework last night.     TREATMENT AREA =  Other low back pain [M54.59]     OBJECTIVE         Therapeutic Procedures:    Tx Min Billable or 1:1 Min (if diff from Tx Min) Procedure, Rationale, Specifics   24  91575 Therapeutic Exercise (timed):  increase ROM, strength, coordination, balance, and proprioception to improve patient's ability to progress to PLOF and address remaining functional goals. (see flow sheet as applicable)     Details if applicable:  see flow sheet     8  52198 Therapeutic Activity (timed):  use of dynamic activities replicating functional movements to increase ROM, strength, coordination, balance, and proprioception in order to improve patient's ability to progress to PLOF and address remaining functional goals.  (see flow sheet as applicable)     Details if applicable:  see flow sheet   10  74409 Neuromuscular Re-Education (timed):  improve balance, coordination, kinesthetic sense, posture, core stability and proprioception to improve patient's ability to develop conscious control of individual muscles and awareness of position of extremities in order to progress to PLOF and address remaining functional goals. (see flow sheet as applicable)     Details if applicable:  see flow sheet          Details if applicable:            Details if applicable:     42  [facility] Totals Reminder: bill using total billable min of TIMED therapeutic procedures (example: do not include dry needle or estim unattended, both untimed codes, in totals to left)  8-22 min = 1

## 2024-11-08 ENCOUNTER — HOSPITAL ENCOUNTER (OUTPATIENT)
Facility: HOSPITAL | Age: 64
Setting detail: RECURRING SERIES
Discharge: HOME | End: 2024-11-11
Payer: COMMERCIAL

## 2024-11-08 PROCEDURE — 97112 NEUROMUSCULAR REEDUCATION: CPT

## 2024-11-08 PROCEDURE — 97110 THERAPEUTIC EXERCISES: CPT

## 2024-11-08 PROCEDURE — 97530 THERAPEUTIC ACTIVITIES: CPT

## 2024-11-12 ENCOUNTER — HOSPITAL ENCOUNTER (OUTPATIENT)
Facility: HOSPITAL | Age: 64
Setting detail: RECURRING SERIES
Discharge: HOME | End: 2024-11-15
Payer: COMMERCIAL

## 2024-11-12 PROCEDURE — 97112 NEUROMUSCULAR REEDUCATION: CPT

## 2024-11-12 PROCEDURE — 97110 THERAPEUTIC EXERCISES: CPT

## 2024-11-12 NOTE — PROGRESS NOTES
PHYSICAL / OCCUPATIONAL THERAPY - DAILY TREATMENT NOTE    Patient Name: Ganga Schroeder    Date: 2024    : 1960  Insurance: Payor: [employer] / Plan: [employer] HEALTHCARE / Product Type: *No Product type* /      Patient  verified Yes     Visit #   Current / Total 8 16   Time   In / Out 1148 1222   Pain   In / Out 4/10 4/10   Subjective Functional Status/Changes: Pt states he had a vasectomy last Thursday and reports soreness in his lower abdominal/groin area. Pt states his doctor told him to limit activity for 48 hours after his surgery.     TREATMENT AREA =  Other low back pain [M54.59]     OBJECTIVE         Therapeutic Procedures:    Tx Min Billable or 1:1 Min (if diff from Tx Min) Procedure, Rationale, Specifics   24  03436 Therapeutic Exercise (timed):  increase ROM, strength, coordination, balance, and proprioception to improve patient's ability to progress to PLOF and address remaining functional goals. (see flow sheet as applicable)     Details if applicable:       x  19166 Therapeutic Activity (timed):  use of dynamic activities replicating functional movements to increase ROM, strength, coordination, balance, and proprioception in order to improve patient's ability to progress to PLOF and address remaining functional goals.  (see flow sheet as applicable)     Details if applicable:     10  58742 Neuromuscular Re-Education (timed):  improve balance, coordination, kinesthetic sense, posture, core stability and proprioception to improve patient's ability to develop conscious control of individual muscles and awareness of position of extremities in order to progress to PLOF and address remaining functional goals. (see flow sheet as applicable)     Details if applicable:            Details if applicable:            Details if applicable:     34  [facility] Totals Reminder: bill using total billable min of TIMED therapeutic procedures (example: do not include dry needle or estim unattended, both

## 2024-11-13 NOTE — PROGRESS NOTES
improvement in functional activity tolerance.  Status at last note/certification: 34%  Current:    4. Pt will improve b/l 90-90 Hams to </= 30 deg for decreased lumbar strain with bending.  Status at last note/certification: R 45 (p!), L 40 (p!)  Current:   (11/5/24) performed HS at stairs, patient continuing to demonstrate reduced HS flexibility.   5. Pt will report max pain </= 2/10 with prolonged sitting to decrease impairment with ADL's  Status at last note/certification: 8/10 max pain  Current: 11/12/24: Progressing, 6/10 max pain    Next PN/ RC due 12/1/24  Auth due (visit number/ date) TERRI; 12/31/24    PLAN  - Continue Plan of Care  - Upgrade activities as tolerated    Roderick Baxter PTA    11/15/2024    6:56 AM  If an interpreting service was utilized for treatment of this patient, the contents of this document represent the material reviewed with the patient via the .     Future Appointments   Date Time Provider Department Center   11/15/2024  8:20 AM Roderick Baxter PTA [facility]   11/18/2024  3:30 PM Milena Coronel, WILFRIDO [facility] [place] Sched   11/19/2024 10:20 AM Indy Ledezma PTA [facility]   11/22/2024  9:00 AM Roderick Baxter PTA [facility]   11/26/2024  9:40 AM Roderick Baxter PTA [facility]   12/24/2024 10:00 AM Tod Blandon, PA [facility] [place] Sched

## 2024-11-14 ENCOUNTER — APPOINTMENT (OUTPATIENT)
Facility: HOSPITAL | Age: 64
End: 2024-11-14
Payer: COMMERCIAL

## 2024-11-15 ENCOUNTER — HOSPITAL ENCOUNTER (OUTPATIENT)
Facility: HOSPITAL | Age: 64
Setting detail: RECURRING SERIES
Discharge: HOME | End: 2024-11-18
Payer: COMMERCIAL

## 2024-11-15 PROCEDURE — 97112 NEUROMUSCULAR REEDUCATION: CPT

## 2024-11-15 PROCEDURE — 97535 SELF CARE MNGMENT TRAINING: CPT

## 2024-11-15 PROCEDURE — 97110 THERAPEUTIC EXERCISES: CPT

## 2024-11-15 PROCEDURE — 97530 THERAPEUTIC ACTIVITIES: CPT

## 2024-11-18 NOTE — PROGRESS NOTES
ADL's  Status at last note/certification: 8/10 max pain  Current: 11/12/24: Progressing, 6/10 max pain; 11/19/24: Progressing, no pain at end of tx session    Next PN/ RC due 12/1/24  Auth due (visit number/ date) TERRI; 12/31/24    PLAN  - Continue Plan of Care  - Upgrade activities as tolerated    Roderick Baxter PTA    11/19/2024    7:54 AM  If an interpreting service was utilized for treatment of this patient, the contents of this document represent the material reviewed with the patient via the .     Future Appointments   Date Time Provider Department Center   11/18/2024  3:30 PM Milena Coronel, WILFRIDO [facility] [place] [facility]   11/19/2024 10:20 AM Roderick Baxter PTA [facility]   11/22/2024  9:00 AM Roderick Baxter PTA [facility]   11/26/2024  9:40 AM Roderick Baxter PTA [facility]   12/24/2024 10:00 AM Tod Blandon PA [facility] [place]

## 2024-11-19 ENCOUNTER — HOSPITAL ENCOUNTER (OUTPATIENT)
Facility: HOSPITAL | Age: 64
Setting detail: RECURRING SERIES
Discharge: HOME | End: 2024-11-22
Payer: COMMERCIAL

## 2024-11-19 PROCEDURE — 97110 THERAPEUTIC EXERCISES: CPT

## 2024-11-19 PROCEDURE — 97530 THERAPEUTIC ACTIVITIES: CPT

## 2024-11-19 PROCEDURE — 97112 NEUROMUSCULAR REEDUCATION: CPT

## 2024-11-20 NOTE — PROGRESS NOTES
PHYSICAL / OCCUPATIONAL THERAPY - DAILY TREATMENT NOTE    Patient Name: Ganga Schroeder    Date: 2024    : 1960  Insurance: Payor: [employer] / Plan: [employer] HEALTHCARE / Product Type: *No Product type* /      Patient  verified Yes     Visit #   Current / Total 11 16   Time   In / Out *** ***   Pain   In / Out *** ***   Subjective Functional Status/Changes: See PN     TREATMENT AREA =  Other low back pain [M54.59]     OBJECTIVE    {[employer] Modality Grid:02030}     Therapeutic Procedures:    Tx Min Billable or 1:1 Min (if diff from Tx Min) Procedure, Rationale, Specifics     21022 Therapeutic Exercise (timed):  increase ROM, strength, coordination, balance, and proprioception to improve patient's ability to progress to PLOF and address remaining functional goals. (see flow sheet as applicable)     Details if applicable:         27406 Therapeutic Activity (timed):  use of dynamic activities replicating functional movements to increase ROM, strength, coordination, balance, and proprioception in order to improve patient's ability to progress to PLOF and address remaining functional goals.  (see flow sheet as applicable)     Details if applicable:       94449 Neuromuscular Re-Education (timed):  improve balance, coordination, kinesthetic sense, posture, core stability and proprioception to improve patient's ability to develop conscious control of individual muscles and awareness of position of extremities in order to progress to PLOF and address remaining functional goals. (see flow sheet as applicable)     Details if applicable:       {In[employer] Ther Procedures:74450}     Details if applicable:       {In[employer] Ther Procedures:94421}     Details if applicable:       [facility] Totals Reminder: bill using total billable min of TIMED therapeutic procedures (example: do not include dry needle or estim unattended, both untimed codes, in totals to left)  8-22 min = 1 unit; 23-37 min = 2 units; 38-52 min =

## 2024-11-22 ENCOUNTER — HOSPITAL ENCOUNTER (OUTPATIENT)
Facility: HOSPITAL | Age: 64
Setting detail: RECURRING SERIES
Discharge: HOME | End: 2024-11-25
Payer: COMMERCIAL

## 2024-11-22 NOTE — THERAPY RECERTIFICATION
[facility]  [address] [place] [address]  Phone: [phone]   [phone]  PHYSICAL THERAPY PROGRESS NOTE  Patient Name: Ganga Schroeder : 1960   Treatment/Medical Diagnosis: Other low back pain [M54.59]   Referral Source: Toney Reynolds MD     Date of Initial Visit: 10/2/2024 Attended Visits: 11 Missed Visits: 4     SUMMARY OF TREATMENT  Physical therapy has consisted of therapeutic exercises for increased core and L/S strength, ROM, and flexibility. Therapeutic activities performed to improve functional activities, model real life movements and performance specific to the patient's need with supervision to return the patient to their PLOF.  Neuromuscular Re-ed performed to improve coordination, improve mm activation, improve proprioception to improve the patient's ability to perform ADL/IADL's.  Manual therapy to L/S for decreased muscle hypertonicity and increased ROM/flexibility. Moist heat with IFC provided PRN for palliative. Pt education provided regarding HEP.     CURRENT STATUS  Pt reports ***% improvement since starting therapy. Pt has attended *** visits since start of therapy and has missed *** visits. Pt has met *** out of *** goals and is progressing towards remaining goals. FOTO ***. Pt reports functional gains of ***. Pt reports functional deficits of ***. Pt can benefit from additional skilled therapy to increase functional strength and mobility and decrease pain for ease of ADL's and improved activity tolerance.    % improvement: ***  Max pain ***  Avg pain ***  Min pain ***    Current improvements: ***  Remaining functional limitations: ***    Objective measures:  FUNCTIONAL ASSESSMENT:                  TA iso: Good               Bridge: Full AROM with l/s lordosis     ROM    LUMBAR: Flex fingertips to prox shins, Ext 20%, SB R/L fingertips to distal thighs.  ROT R 50%, L 50%                HIPS:  IR (in prone)

## 2024-11-26 ENCOUNTER — PRE-OP/H&P (OUTPATIENT)
Dept: URBAN - METROPOLITAN AREA CLINIC 1 | Facility: CLINIC | Age: 64
End: 2024-11-26

## 2024-11-26 ENCOUNTER — APPOINTMENT (OUTPATIENT)
Facility: HOSPITAL | Age: 64
End: 2024-11-26
Payer: COMMERCIAL

## 2024-11-26 VITALS
BODY MASS INDEX: 31.67 KG/M2 | DIASTOLIC BLOOD PRESSURE: 92 MMHG | WEIGHT: 209 LBS | HEART RATE: 75 BPM | HEIGHT: 68 IN | SYSTOLIC BLOOD PRESSURE: 172 MMHG

## 2024-11-26 DIAGNOSIS — H25.813: ICD-10-CM

## 2024-11-26 PROCEDURE — 92136 OPHTHALMIC BIOMETRY: CPT

## 2024-11-26 PROCEDURE — 99213 OFFICE O/P EST LOW 20 MIN: CPT

## 2024-11-26 PROCEDURE — 92025 CPTRIZED CORNEAL TOPOGRAPHY: CPT | Mod: NC

## 2024-12-02 NOTE — THERAPY RECERTIFICATION
limitations: Decreased sitting tolerance, heavy lifting, decreased HS flexibility    Objective measures:  ROM    LUMBAR: Flex fingertips to prox shins, Ext 25%, SB R/L fingertips to distal thighs.  ROT R 50%, L 50%                SPECIAL TESTS:  (+) 90-90 hams: R 56 \"tight\", L 55 \"tight\"     OSW 24    New Goals to be achieved in __4__ WEEKS  1.  Pt will be educated in/compliant with appropriate HEP to decrease pain, increase ROM, increase strength and return pt to PLOF.    Status at last note/certification: performs 3-4x/week  Current: 12/3/24: Goal met  2. Pt will improve lumbar flexion fingertips to mid shins for decreased impairment with bending activities.  Status at last note/certification:  prox shins  Current:   12/3/24: Goal not met, no change, prox shins  3. Pt will improve Oswestry score to </= 32% to demo a significant improvement in functional activity tolerance.  Status at last note/certification: 34%  Current:  12/3/24: Goal met, 24%  4. Pt will improve b/l 90-90 Hams to </= 30 deg for decreased lumbar strain with bending.  Status at last note/certification: R 45 (p!), L 40 (p!)  Current:   12/3/24: Goal not met but progressing, R 56 \"tight\", L 55 \"tight\"  5. Pt will report max pain </= 2/10 with prolonged sitting to decrease impairment with ADL's  Status at last note/certification: 8/10 max pain  Current: 12/3/24: Goal not met but progressing, 7/10 max pain      Payor: [employer] / Plan: [employer] - [employer]US / Product Type: *No Product type* /     Non-Medicare, can change goals, can adjust or add frequency duration, no signature required      New Goals to be achieved in __4__ WEEKS  1. Pt will improve lumbar flexion fingertips to mid shins for decreased impairment with bending activities.  Status at last note/certification:  prox shins  Current:     2. Pt will improve b/l 90-90 Hams to </= 30 deg for decreased lumbar strain with bending.  Status at last note/certification: R 56

## 2024-12-02 NOTE — PROGRESS NOTES
PHYSICAL / OCCUPATIONAL THERAPY - DAILY TREATMENT NOTE    Patient Name: Ganga Schroeder    Date: 12/3/2024    : 1960  Insurance: Payor: [employer] / Plan: [employer] - CHOICE [employer]US / Product Type: *No Product type* /      Patient  verified Yes     Visit #   Current / Total 11 16   Time   In / Out 1020 1058   Pain   In / Out 0-1/10 010   Subjective Functional Status/Changes: See PN     TREATMENT AREA =  Other low back pain [M54.59]     OBJECTIVE         Therapeutic Procedures:    Tx Min Billable or 1:1 Min (if diff from Tx Min) Procedure, Rationale, Specifics   10  61833 Therapeutic Exercise (timed):  increase ROM, strength, coordination, balance, and proprioception to improve patient's ability to progress to PLOF and address remaining functional goals. (see flow sheet as applicable)     Details if applicable:       20  36174 Therapeutic Activity (timed):  use of dynamic activities replicating functional movements to increase ROM, strength, coordination, balance, and proprioception in order to improve patient's ability to progress to PLOF and address remaining functional goals.  (see flow sheet as applicable)     Details if applicable:  reassessment, Oswestry   8  14956 Neuromuscular Re-Education (timed):  improve balance, coordination, kinesthetic sense, posture, core stability and proprioception to improve patient's ability to develop conscious control of individual muscles and awareness of position of extremities in order to progress to PLOF and address remaining functional goals. (see flow sheet as applicable)     Details if applicable:            Details if applicable:            Details if applicable:     38  [facility] Totals Reminder: bill using total billable min of TIMED therapeutic procedures (example: do not include dry needle or estim unattended, both untimed codes, in totals to left)  8-22 min = 1 unit; 23-37 min = 2 units; 38-52 min = 3 units; 53-67 min = 4 units; 68-82 min = 5

## 2024-12-03 ENCOUNTER — HOSPITAL ENCOUNTER (OUTPATIENT)
Facility: HOSPITAL | Age: 64
Setting detail: RECURRING SERIES
Discharge: HOME | End: 2024-12-06
Payer: COMMERCIAL

## 2024-12-03 PROCEDURE — 97112 NEUROMUSCULAR REEDUCATION: CPT

## 2024-12-03 PROCEDURE — 97530 THERAPEUTIC ACTIVITIES: CPT

## 2024-12-03 PROCEDURE — 97110 THERAPEUTIC EXERCISES: CPT

## 2024-12-04 ENCOUNTER — SURGERY/PROCEDURE (OUTPATIENT)
Age: 64
End: 2024-12-04

## 2024-12-04 DIAGNOSIS — H25.812: ICD-10-CM

## 2024-12-04 PROCEDURE — 68841 INSJ RX ELUT IMPLT LAC CANAL: CPT | Mod: 51,LT

## 2024-12-04 PROCEDURE — 66984AV REMOVE CATARACT, INSERT ADVANCED LENS

## 2024-12-04 PROCEDURE — 65772LRI LRI DURING CAT SX

## 2024-12-04 PROCEDURE — 66999LNSR LENSAR LASER FOR CAT SX

## 2024-12-04 PROCEDURE — 99199PAV PROF ADVANCED VISION PACKAGE

## 2024-12-05 ENCOUNTER — POST-OP (OUTPATIENT)
Age: 64
End: 2024-12-05

## 2024-12-05 ENCOUNTER — TELEPHONE (OUTPATIENT)
Facility: HOSPITAL | Age: 64
End: 2024-12-05

## 2024-12-05 DIAGNOSIS — Z96.1: ICD-10-CM

## 2024-12-05 NOTE — TELEPHONE ENCOUNTER
Provider Cancellation. Roderikc will not be in the office on 12/6/2024. No other appt available at this time, offered the wait list and patient declined. Will pick back up at his next appt.

## 2024-12-06 ENCOUNTER — HOSPITAL ENCOUNTER (OUTPATIENT)
Facility: HOSPITAL | Age: 64
Setting detail: RECURRING SERIES
End: 2024-12-06
Payer: COMMERCIAL

## 2024-12-09 NOTE — PROGRESS NOTES
PHYSICAL / OCCUPATIONAL THERAPY - DAILY TREATMENT NOTE    Patient Name: Ganga Schroeder    Date: 12/10/2024    : 1960  Insurance: Payor: [employer] / Plan: [employer] - CHOICE PLUS / Product Type: *No Product type* /      Patient  verified Yes     Visit #   Current / Total 12 19   Time   In / Out 1020 1106   Pain   In / Out 0/10 010   Subjective Functional Status/Changes: Pt states he was moving boxes all day yesterday but reports back felt great all day. Reports soreness but no back pain today.     TREATMENT AREA =  Other low back pain [M54.59]     OBJECTIVE         Therapeutic Procedures:    Tx Min Billable or 1:1 Min (if diff from Tx Min) Procedure, Rationale, Specifics   24  04108 Therapeutic Exercise (timed):  increase ROM, strength, coordination, balance, and proprioception to improve patient's ability to progress to PLOF and address remaining functional goals. (see flow sheet as applicable)     Details if applicable:       8  98420 Therapeutic Activity (timed):  use of dynamic activities replicating functional movements to increase ROM, strength, coordination, balance, and proprioception in order to improve patient's ability to progress to PLOF and address remaining functional goals.  (see flow sheet as applicable)     Details if applicable:     14  94567 Neuromuscular Re-Education (timed):  improve balance, coordination, kinesthetic sense, posture, core stability and proprioception to improve patient's ability to develop conscious control of individual muscles and awareness of position of extremities in order to progress to PLOF and address remaining functional goals. (see flow sheet as applicable)     Details if applicable:            Details if applicable:            Details if applicable:     46  [facility] Totals Reminder: bill using total billable min of TIMED therapeutic procedures (example: do not include dry needle or estim unattended, both untimed codes, in totals to

## 2024-12-10 ENCOUNTER — HOSPITAL ENCOUNTER (OUTPATIENT)
Facility: HOSPITAL | Age: 64
Setting detail: RECURRING SERIES
Discharge: HOME | End: 2024-12-13
Payer: COMMERCIAL

## 2024-12-10 ENCOUNTER — POST OP/EVAL OF SECOND EYE (OUTPATIENT)
Age: 64
End: 2024-12-10

## 2024-12-10 VITALS
HEIGHT: 68 IN | SYSTOLIC BLOOD PRESSURE: 149 MMHG | BODY MASS INDEX: 31.52 KG/M2 | WEIGHT: 208 LBS | DIASTOLIC BLOOD PRESSURE: 94 MMHG | HEART RATE: 74 BPM

## 2024-12-10 DIAGNOSIS — H25.811: ICD-10-CM

## 2024-12-10 DIAGNOSIS — Z96.1: ICD-10-CM

## 2024-12-10 PROCEDURE — 97110 THERAPEUTIC EXERCISES: CPT

## 2024-12-10 PROCEDURE — 92025 CPTRIZED CORNEAL TOPOGRAPHY: CPT | Mod: NC

## 2024-12-10 PROCEDURE — 97112 NEUROMUSCULAR REEDUCATION: CPT

## 2024-12-10 PROCEDURE — 97530 THERAPEUTIC ACTIVITIES: CPT

## 2024-12-10 PROCEDURE — 92136 OPHTHALMIC BIOMETRY: CPT | Mod: 26

## 2024-12-12 ENCOUNTER — HOSPITAL ENCOUNTER (OUTPATIENT)
Facility: HOSPITAL | Age: 64
Setting detail: RECURRING SERIES
Discharge: HOME | End: 2024-12-15
Payer: COMMERCIAL

## 2024-12-12 PROCEDURE — 97530 THERAPEUTIC ACTIVITIES: CPT

## 2024-12-12 PROCEDURE — 97110 THERAPEUTIC EXERCISES: CPT

## 2024-12-12 PROCEDURE — 97112 NEUROMUSCULAR REEDUCATION: CPT

## 2024-12-13 NOTE — PROGRESS NOTES
erick Baxter PTA    12/16/2024    8:37 AM  If an interpreting service was utilized for treatment of this patient, the contents of this document represent the material reviewed with the patient via the .     Future Appointments   Date Time Provider Department Center   12/16/2024  9:40 AM Roderick Baxter PTA [facility]   12/18/2024 11:00 AM Roderick Baxter PTA [facility]   12/23/2024 12:20 PM Roderick Baxter PTA [facility]   12/24/2024 10:00 AM Tod Blandon PA [facility]   12/26/2024 10:20 AM Roderick Baxter PTA [facility]

## 2024-12-16 ENCOUNTER — HOSPITAL ENCOUNTER (OUTPATIENT)
Facility: HOSPITAL | Age: 64
Setting detail: RECURRING SERIES
Discharge: HOME | End: 2024-12-19
Payer: COMMERCIAL

## 2024-12-16 PROCEDURE — 97110 THERAPEUTIC EXERCISES: CPT

## 2024-12-16 PROCEDURE — 97530 THERAPEUTIC ACTIVITIES: CPT

## 2024-12-16 PROCEDURE — 97112 NEUROMUSCULAR REEDUCATION: CPT

## 2024-12-18 ENCOUNTER — HOSPITAL ENCOUNTER (OUTPATIENT)
Facility: HOSPITAL | Age: 64
Setting detail: RECURRING SERIES
Discharge: HOME | End: 2024-12-21
Payer: COMMERCIAL

## 2024-12-18 PROCEDURE — 97530 THERAPEUTIC ACTIVITIES: CPT

## 2024-12-18 PROCEDURE — 97112 NEUROMUSCULAR REEDUCATION: CPT

## 2024-12-18 PROCEDURE — 97110 THERAPEUTIC EXERCISES: CPT

## 2024-12-19 ENCOUNTER — SURGERY/PROCEDURE (OUTPATIENT)
Age: 64
End: 2024-12-19

## 2024-12-19 DIAGNOSIS — H25.811: ICD-10-CM

## 2024-12-19 PROCEDURE — 66999LNSR LENSAR LASER FOR CAT SX

## 2024-12-19 PROCEDURE — 66984AV REMOVE CATARACT, INSERT ADVANCED LENS: Mod: 79,RT

## 2024-12-19 PROCEDURE — 99199PAV PROF ADVANCED VISION PACKAGE

## 2024-12-19 PROCEDURE — 68841 INSJ RX ELUT IMPLT LAC CANAL: CPT | Mod: 51,RT,79,RT

## 2024-12-19 PROCEDURE — 65772LRI LRI DURING CAT SX

## 2024-12-23 ENCOUNTER — HOSPITAL ENCOUNTER (OUTPATIENT)
Facility: HOSPITAL | Age: 64
Setting detail: RECURRING SERIES
Discharge: HOME | End: 2024-12-26
Payer: COMMERCIAL

## 2024-12-23 ENCOUNTER — POST-OP (OUTPATIENT)
Age: 64
End: 2024-12-23

## 2024-12-23 DIAGNOSIS — Z96.1: ICD-10-CM

## 2024-12-23 PROCEDURE — 97535 SELF CARE MNGMENT TRAINING: CPT

## 2024-12-23 PROCEDURE — 97110 THERAPEUTIC EXERCISES: CPT

## 2024-12-23 PROCEDURE — 97530 THERAPEUTIC ACTIVITIES: CPT

## 2024-12-23 PROCEDURE — 97112 NEUROMUSCULAR REEDUCATION: CPT

## 2024-12-23 NOTE — PROGRESS NOTES
PHYSICAL / OCCUPATIONAL THERAPY - DAILY TREATMENT NOTE    Patient Name: Ganga Schroeder    Date: 2024    : 1960  Insurance: Payor: [employer] / Plan: [employer] - CHOICE PLUS / Product Type: *No Product type* /      Patient  verified Yes     Visit #   Current / Total 16 19   Time   In / Out 1222 110   Pain   In / Out 0 0   Subjective Functional Status/Changes: Patient reports no changes since last visit.      TREATMENT AREA =  Other low back pain [M54.59]     OBJECTIVE         Therapeutic Procedures:    Tx Min Billable or 1:1 Min (if diff from Tx Min) Procedure, Rationale, Specifics   16  44118 Therapeutic Exercise (timed):  increase ROM, strength, coordination, balance, and proprioception to improve patient's ability to progress to PLOF and address remaining functional goals. (see flow sheet as applicable)     Details if applicable:       97530 Therapeutic Activity (timed):  use of dynamic activities replicating functional movements to increase ROM, strength, coordination, balance, and proprioception in order to improve patient's ability to progress to PLOF and address remaining functional goals.  (see flow sheet as applicable)     Details if applicable:     112 Neuromuscular Re-Education (timed):  improve balance, coordination, kinesthetic sense, posture, core stability and proprioception to improve patient's ability to develop conscious control of individual muscles and awareness of position of extremities in order to progress to PLOF and address remaining functional goals. (see flow sheet as applicable)     Details if applicable:       00069 Self Care/Home Management (timed):  improve patient knowledge and understanding of anatomy and physiology of hip flexors and extensors as it pertains to increased ROM during functional lifting/reaching  to improve patient's ability to progress to PLOF and address remaining functional goals.  (see flow sheet as applicable)

## 2024-12-26 ENCOUNTER — HOSPITAL ENCOUNTER (OUTPATIENT)
Facility: HOSPITAL | Age: 64
Setting detail: RECURRING SERIES
End: 2024-12-26
Payer: COMMERCIAL

## 2024-12-30 ENCOUNTER — HOSPITAL ENCOUNTER (OUTPATIENT)
Facility: HOSPITAL | Age: 64
Setting detail: RECURRING SERIES
Discharge: HOME | End: 2025-01-02
Payer: COMMERCIAL

## 2024-12-30 PROCEDURE — 97535 SELF CARE MNGMENT TRAINING: CPT

## 2024-12-30 PROCEDURE — 97110 THERAPEUTIC EXERCISES: CPT

## 2024-12-30 PROCEDURE — 97530 THERAPEUTIC ACTIVITIES: CPT

## 2024-12-30 NOTE — THERAPY DISCHARGE
PT DISCHARGE DAILY NOTE AND SUMMARY     If signature is requested please return to:    [facility]  Fax: [phone]    Date:2024  Patient name: Ganga Schroeder Start of Care: 10/2/2024   Referral source: Toney Reynolds MD : 1960   Medical/Treatment Diagnosis: Other low back pain [M54.59] Onset Date:7/15/24      Prior Hospitalization: see medical history Provider#: [id number]   Comorbidities: Diabetes mellitus, ADD, anxiety, depression, HTN, BMI>30, sleep apnea   Prior Level of Function:No limitations to normal ADL/IADL's and yardwork. Retired.   Insurance: Payor: [employer] / Plan: [employer] - CHOICE PLUS / Product Type: *No Product type* /      Visits from Start of Care: 17 Missed Visits: 5    non-Medicare    Patient  verified yes     Visit #   Current / Total 17 19   Time   In / Out 900 940   Pain   In / Out 0/10 0/10   Subjective Functional Status/Changes: Pt reports overall improvement since starting therapy. Report no pain today.     TREATMENT AREA =  Other low back pain [M54.59]      OBJECTIVE         Therapeutic Procedures:    Tx Min Billable or 1:1 Min (if diff from Tx Min) Procedure, Rationale, Specifics   24  42508 Therapeutic Activity (timed):  use of dynamic activities replicating functional movements to increase ROM, strength, coordination, balance, and proprioception in order to improve patient's ability to progress to PLOF and address remaining functional goals.  (see flow sheet as applicable)     Details if applicable:  reassessment, Oswestry     8  56595 Therapeutic Exercise (timed):  increase ROM, strength, coordination, balance, and proprioception to improve patient's ability to progress to PLOF and address remaining functional goals. (see flow sheet as applicable)     Details if applicable:     8  01943 Self Care/Home Management (timed):  improve patient knowledge and understanding of home injury/symptom/pain management, posture/ergonomics,

## 2024-12-30 NOTE — PROGRESS NOTES
Physical Therapy Discharge Instructions  [facility]  [address] [place] [address]  P: [phone] F: [phone]    Patient: Ganga Schroeder  : 1960    Reason for Discharge From PT:  [x]Met/progressing towards all set goals    []Minimal progress made towards set goals    []Met a plateau in progress/improvement    []Insurance/financial issues    []Other:     Recommendations:   [x] Return to activity with home program as prescribed on print-outs    [] Return to activity with the following modifications:     [] [employer] fitness training     [] Return to/join local gym    [] Other:      Continue with      [] Ice [x] Heat   as needed for 10-15 minutes to relieve pain  *If pain does not improve after several days, follow-up with your physician for a consult*           Follow up with MD:     [] Upon completion of therapy   [x] As needed      Additional Comments: Continue with home exercise program    Thank you for choosing [facility] for your PT needs!      Roderick Baxter, CELINA 2024 8:54 AM

## 2025-01-21 ENCOUNTER — POST-OP (OUTPATIENT)
Age: 65
End: 2025-01-21

## 2025-01-21 DIAGNOSIS — Z96.1: ICD-10-CM

## 2025-03-14 PROBLEM — M62.82 RHABDOMYOLYSIS: Status: ACTIVE | Noted: 2025-03-14

## 2025-05-15 NOTE — THERAPY EVALUATION
activities.    Today's evaulation is significant for:  Fall Risk Assessment: Does the patient have a fear of falling? YES.   If yes, what fall risk assessment was performed?    Fall in march 2025 when he lost balance while walking to bathroom in the middle of the night. Pt c/o feeling occasionally unsteady.   Romberg Floor EO WNL. EC 30\" with increased ankle strategy  SLS R 2\", L 4\"    POSTURE/OBSERVATION: flattened thoraco-lumbar curvatures. Stands with slight R hip ER and slight L wt shift.    FUNCTIONAL ASSESSMENT:  Ambulates with R>L trendelenburg, R forefoot abd   Squat ~70 deg knee flex with c/o b/l hamstring discomfort 2/2 \"I'm not flexible\"   Double leg Heel Raise: WNL   Double leg Toe Raise: WNL   TA iso: Fair (requires heavy cues and difficulty avoiding valsalva)   Bridge: 100% AROM    ROM   LUMBAR: Flex fingertips to patella, Ext 50%. SB R fingertips to distal thigh (p!), L fingertips to distal thigh.  ROT R 75% (p!), L 50%   HIPS: Ext (in sidelying) R 16 deg, L 11 deg. IR (in prone) R 10 deg, L 5 deg.  ER (in prone) R 37 deg, L 29 deg.     STRENGTH  HIP: flex R 5/5, L 4/5; ext R 5/5, L 5/5; abd R 5/5, L 5/5  KNEE: Ext R 5/5, L 5/5; flex R 4+/5, L 4+/5  ANKLE: DF R 5/5, L 5/5.      SPECIAL TESTS:  (+) 90-90 Hams R 55, L 39.  R LEILA  (-) SLR, L LEILA     ADDITIONAL FINDINGS:   NEURO: Light touch intact b/l LE dermatomes.  REFLEXES: Quad b/l 2+. Achilles b/l 2+  PALPATION: mild hypertonicity b/l upper glutes, L>R QL  PIVM: Grade II lower thoracic P/A. Grade III lumbar P/A       These findings are supportive for diagnosis of lumbago.  Pt will be a good candidate for skilled PT to address these impairments and promote return to normal ADLs and functional mobility for improved quality of life.     Not post operative, standard auth procedure    Evaluation Complexity:  History:  HIGH Complexity :3+ comorbidities / personal factors will impact the outcome/ POC ; Examination:  MEDIUM Complexity : 3 Standardized

## 2025-05-16 ENCOUNTER — HOSPITAL ENCOUNTER (OUTPATIENT)
Facility: HOSPITAL | Age: 65
Setting detail: RECURRING SERIES
Discharge: HOME | End: 2025-05-19
Payer: COMMERCIAL

## 2025-05-16 PROCEDURE — 97161 PT EVAL LOW COMPLEX 20 MIN: CPT

## 2025-05-16 NOTE — PROGRESS NOTES
PHYSICAL / OCCUPATIONAL THERAPY - DAILY TREATMENT NOTE (updated 3/2025)  For Eval visit    Patient Name: Ganga Schroeder    Date: 2025    : 1960  Insurance: Payor: [employer] / Plan: [employer] - CHOICE PLUS / Product Type: *No Product type* /      Patient  verified yes     Visit #   Current / Total 1 16   Time   In / Out 8:20 9:00   Pain   In / Out 5 5   Subjective Functional Status/Changes: See POC     TREATMENT AREA =  see POC    OBJECTIVE       40 min   Eval - untimed                      Therapeutic Procedures:    Tx Min Billable or 1:1 Min (if diff from Tx Min) Procedure, Rationale, Specifics          Details if applicable:              Details if applicable:            Details if applicable:            Details if applicable:       [facility] Totals Reminder: bill using total billable min of TIMED therapeutic procedures (example: do not include dry needle or estim unattended, both untimed codes, in totals to left)  8-22 min = 1 unit; 23-37 min = 2 units; 38-52 min = 3 units; 53-67 min = 4 units; 68-82 min = 5 units   Total Total     Charge Capture    [x]  Patient Education billed concurrently with other procedures   [x] Review HEP    [] Progressed/Changed HEP, detail:    [] Other detail:       Objective Information/Functional Measures/Assessment    See POC    Patient will continue to benefit from skilled PT / OT services to modify and progress therapeutic interventions, analyze and address functional mobility deficits, analyze and address ROM deficits, analyze and address strength deficits, analyze and address soft tissue restrictions, analyze and cue for proper movement patterns, analyze and modify for postural abnormalities, analyze and address imbalance/dizziness, and instruct in home and community integration to address functional deficits and attain remaining goals.    Progress toward goals / Updated goals:  [x]  See POC        Next PN/ RC due 25  Auth due     PLAN  yes

## 2025-05-21 ENCOUNTER — HOSPITAL ENCOUNTER (OUTPATIENT)
Facility: HOSPITAL | Age: 65
Setting detail: RECURRING SERIES
Discharge: HOME | End: 2025-05-24
Payer: COMMERCIAL

## 2025-05-21 PROCEDURE — 97110 THERAPEUTIC EXERCISES: CPT

## 2025-05-21 PROCEDURE — 97530 THERAPEUTIC ACTIVITIES: CPT

## 2025-05-21 PROCEDURE — 97112 NEUROMUSCULAR REEDUCATION: CPT

## 2025-05-21 NOTE — PROGRESS NOTES
PHYSICAL / OCCUPATIONAL THERAPY - DAILY TREATMENT NOTE    Patient Name: Ganga Schroeder    Date: 2025    : 1960  Insurance: Payor: [employer] / Plan: [employer] - CHOICE PLUS / Product Type: *No Product type* /      Patient  verified Yes     Visit #   Current / Total 2 16   Time   In / Out 1219 103   Pain   In / Out 4-5 4-5   Subjective Functional Status/Changes: Pt reports no change in pain. States sitting for approx 30 min before experiencing back pain.     TREATMENT AREA =  Intervertebral disc disorders with radiculopathy, lumbosacral region  Other low back pain    OBJECTIVE         Therapeutic Procedures:    Tx Min Billable or 1:1 Min (if diff from Tx Min) Procedure, Rationale, Specifics   23  06614 Therapeutic Exercise (timed):  increase ROM, strength, coordination, balance, and proprioception to improve patient's ability to progress to PLOF and address remaining functional goals. (see flow sheet as applicable)     Details if applicable:       10  63406 Therapeutic Activity (timed):  use of dynamic activities replicating functional movements to increase ROM, strength, coordination, balance, and proprioception in order to improve patient's ability to progress to PLOF and address remaining functional goals.  (see flow sheet as applicable)     Details if applicable:     11  18309 Neuromuscular Re-Education (timed):  improve balance, coordination, kinesthetic sense, posture, core stability and proprioception to improve patient's ability to develop conscious control of individual muscles and awareness of position of extremities in order to progress to PLOF and address remaining functional goals. (see flow sheet as applicable)     Details if applicable:            Details if applicable:            Details if applicable:     44   [facility] Totals Reminder: bill using total billable min of TIMED therapeutic procedures (example: do not include dry needle or estim unattended, both untimed

## 2025-05-23 ENCOUNTER — HOSPITAL ENCOUNTER (OUTPATIENT)
Facility: HOSPITAL | Age: 65
Setting detail: RECURRING SERIES
Discharge: HOME | End: 2025-05-26
Payer: COMMERCIAL

## 2025-05-23 PROCEDURE — 97530 THERAPEUTIC ACTIVITIES: CPT

## 2025-05-23 PROCEDURE — 97110 THERAPEUTIC EXERCISES: CPT

## 2025-05-23 PROCEDURE — 97112 NEUROMUSCULAR REEDUCATION: CPT

## 2025-05-23 NOTE — PROGRESS NOTES
PHYSICAL / OCCUPATIONAL THERAPY - DAILY TREATMENT NOTE    Patient Name: Ganga Schroeder    Date: 2025    : 1960  Insurance: Payor: [employer] / Plan: [employer] - CHOICE PLUS / Product Type: *No Product type* /      Patient  verified Yes     Visit #   Current / Total 3 16   Time   In / Out 148 220   Pain   In / Out 6/10 5/10   Subjective Functional Status/Changes: Pt reports increased back pain today. States he was up all night working and was sitting for long periods.     TREATMENT AREA =  Intervertebral disc disorders with radiculopathy, lumbosacral region  Other low back pain    OBJECTIVE         Therapeutic Procedures:    Tx Min Billable or 1:1 Min (if diff from Tx Min) Procedure, Rationale, Specifics   16  47639 Therapeutic Exercise (timed):  increase ROM, strength, coordination, balance, and proprioception to improve patient's ability to progress to PLOF and address remaining functional goals. (see flow sheet as applicable)     Details if applicable:       8  40489 Therapeutic Activity (timed):  use of dynamic activities replicating functional movements to increase ROM, strength, coordination, balance, and proprioception in order to improve patient's ability to progress to PLOF and address remaining functional goals.  (see flow sheet as applicable)     Details if applicable:     8  34623 Neuromuscular Re-Education (timed):  improve balance, coordination, kinesthetic sense, posture, core stability and proprioception to improve patient's ability to develop conscious control of individual muscles and awareness of position of extremities in order to progress to PLOF and address remaining functional goals. (see flow sheet as applicable)     Details if applicable:            Details if applicable:            Details if applicable:     32  [facility] Totals Reminder: bill using total billable min of TIMED therapeutic procedures (example: do not include dry needle or estim unattended, both

## 2025-05-28 ENCOUNTER — HOSPITAL ENCOUNTER (OUTPATIENT)
Facility: HOSPITAL | Age: 65
Setting detail: RECURRING SERIES
End: 2025-05-28
Payer: COMMERCIAL

## 2025-05-30 ENCOUNTER — HOSPITAL ENCOUNTER (OUTPATIENT)
Facility: HOSPITAL | Age: 65
Setting detail: RECURRING SERIES
End: 2025-05-30
Payer: COMMERCIAL

## 2025-06-02 ENCOUNTER — HOSPITAL ENCOUNTER (OUTPATIENT)
Facility: HOSPITAL | Age: 65
Setting detail: RECURRING SERIES
End: 2025-06-02
Payer: COMMERCIAL

## 2025-06-04 ENCOUNTER — HOSPITAL ENCOUNTER (OUTPATIENT)
Facility: HOSPITAL | Age: 65
Setting detail: RECURRING SERIES
Discharge: HOME | End: 2025-06-07
Payer: COMMERCIAL

## 2025-06-04 PROCEDURE — 97530 THERAPEUTIC ACTIVITIES: CPT

## 2025-06-04 PROCEDURE — 97110 THERAPEUTIC EXERCISES: CPT

## 2025-06-04 PROCEDURE — 97112 NEUROMUSCULAR REEDUCATION: CPT

## 2025-06-04 NOTE — PROGRESS NOTES
PHYSICAL / OCCUPATIONAL THERAPY - DAILY TREATMENT NOTE    Patient Name: Ganga Schroeder    Date: 2025    : 1960  Insurance: Payor: [employer] / Plan: [employer] - CHOICE PLUS / Product Type: *No Product type* /      Patient  verified Yes     Visit #   Current / Total 4 16   Time   In / Out 1100 1145   Pain   In / Out 6/10 4/10   Subjective Functional Status/Changes: Pt reports increased tightness in his hamstrings today.      TREATMENT AREA =  Intervertebral disc disorders with radiculopathy, lumbosacral region  Other low back pain    OBJECTIVE         Therapeutic Procedures:    Tx Min Billable or 1:1 Min (if diff from Tx Min) Procedure, Rationale, Specifics   23  63127 Therapeutic Exercise (timed):  increase ROM, strength, coordination, balance, and proprioception to improve patient's ability to progress to PLOF and address remaining functional goals. (see flow sheet as applicable)     Details if applicable:       10  65305 Therapeutic Activity (timed):  use of dynamic activities replicating functional movements to increase ROM, strength, coordination, balance, and proprioception in order to improve patient's ability to progress to PLOF and address remaining functional goals.  (see flow sheet as applicable)     Details if applicable:     12  96878 Neuromuscular Re-Education (timed):  improve balance, coordination, kinesthetic sense, posture, core stability and proprioception to improve patient's ability to develop conscious control of individual muscles and awareness of position of extremities in order to progress to PLOF and address remaining functional goals. (see flow sheet as applicable)     Details if applicable:            Details if applicable:            Details if applicable:     45  [facility] Totals Reminder: bill using total billable min of TIMED therapeutic procedures (example: do not include dry needle or estim unattended, both untimed codes, in totals to left)  8-22 min = 1

## 2025-06-09 ENCOUNTER — HOSPITAL ENCOUNTER (OUTPATIENT)
Facility: HOSPITAL | Age: 65
Setting detail: RECURRING SERIES
End: 2025-06-09
Payer: COMMERCIAL

## 2025-06-11 ENCOUNTER — HOSPITAL ENCOUNTER (OUTPATIENT)
Facility: HOSPITAL | Age: 65
Setting detail: RECURRING SERIES
End: 2025-06-11
Payer: COMMERCIAL

## 2025-06-16 ENCOUNTER — APPOINTMENT (OUTPATIENT)
Facility: HOSPITAL | Age: 65
End: 2025-06-16
Payer: COMMERCIAL

## 2025-06-18 ENCOUNTER — HOSPITAL ENCOUNTER (OUTPATIENT)
Facility: HOSPITAL | Age: 65
Setting detail: RECURRING SERIES
Discharge: HOME | End: 2025-06-21
Payer: COMMERCIAL

## 2025-06-18 PROCEDURE — 97110 THERAPEUTIC EXERCISES: CPT

## 2025-06-18 PROCEDURE — 97530 THERAPEUTIC ACTIVITIES: CPT

## 2025-06-18 NOTE — PROGRESS NOTES
PHYSICAL / OCCUPATIONAL THERAPY - DAILY TREATMENT NOTE    Patient Name: Ganga Schroeder    Date: 2025    : 1960  Insurance: Payor: [employer] / Plan: [employer] - CHOICE PLUS / Product Type: *No Product type* /      Patient  verified Yes     Visit #   Current / Total 5 16   Time   In / Out 1100 1147   Pain   In / Out 4/10 3/10   Subjective Functional Status/Changes: See PN     TREATMENT AREA =  Intervertebral disc disorders with radiculopathy, lumbosacral region  Other low back pain    OBJECTIVE         Therapeutic Procedures:    Tx Min Billable or 1:1 Min (if diff from Tx Min) Procedure, Rationale, Specifics   38  97189 Therapeutic Activity (timed):  use of dynamic activities replicating functional movements to increase ROM, strength, coordination, balance, and proprioception in order to improve patient's ability to progress to PLOF and address remaining functional goals.  (see flow sheet as applicable)     Details if applicable:  reassessment, Oswestry, LEFS     9  12862 Therapeutic Exercise (timed):  increase ROM, strength, coordination, balance, and proprioception to improve patient's ability to progress to PLOF and address remaining functional goals. (see flow sheet as applicable)     Details if applicable:            Details if applicable:            Details if applicable:            Details if applicable:     47   [facility] Totals Reminder: bill using total billable min of TIMED therapeutic procedures (example: do not include dry needle or estim unattended, both untimed codes, in totals to left)  8-22 min = 1 unit; 23-37 min = 2 units; 38-52 min = 3 units; 53-67 min = 4 units; 68-82 min = 5 units   Total Total     Charge Capture    [x]  Patient Education billed concurrently with other procedures   [x] Review HEP    [] Progressed/Changed HEP, detail:    [] Other detail:       Objective Information/Functional Measures/Assessment    See PN    Patient will continue to benefit from

## 2025-06-18 NOTE — THERAPY RECERTIFICATION
[facility]  [address] [place] [address]  Phone: [phone]   [phone]  PHYSICAL THERAPY PROGRESS NOTE  Patient Name: Ganga Schroeder : 1960   Medical/Treatment Diagnosis: Intervertebral disc disorders with radiculopathy, lumbosacral region  Other low back pain   Referral Source: Toney Reynolds MD     Date of Initial Visit: 2025 Attended Visits: 5 Missed Visits: 4     SUMMARY OF TREATMENT  Physical therapy has consisted of therapeutic exercises for increased core and L/S strength, ROM, and flexibility. Therapeutic activities performed to improve functional activities, model real life movements and performance specific to the patient's need with supervision to return the patient to their PLOF.  Neuromuscular Re-ed performed to improve coordination, improve mm activation, improve proprioception to improve the patient's ability to perform ADL/IADL's. Pt education provided regarding HEP.    CURRENT STATUS  Pt has attended 6 skilled therapy visits since start of therapy. Limited attendance to therapy in the past 4 weeks 2/2 injury and illness. Objective measurements indicate improvements in L/S ROM and B LE flexibility. Pt continues to report limited sitting tolerance 2/2 pain and decreased LE flexibility. Pt can benefit from additional skilled therapy to increase functional strength and mobility for ease of ADL's and improved activity tolerance.    % improvement: 40%  Max pain 8/10  Avg pain 4/10  Min pain 3-4/10    Current improvements: decreased pain, increased walking tolerance  Remaining functional limitations: prolonged sitting, decreased mobility/flexibility, decreased HS flexibility    Objective measures:     FUNCTIONAL ASSESSMENT:  TA iso: Fair (requires heavy cues and difficulty avoiding valsalva)                  ROM   LUMBAR: Flex fingertips to mid-shins, Ext 50%. SB R fingertips to distal thigh, L fingertips to

## 2025-06-23 ENCOUNTER — HOSPITAL ENCOUNTER (OUTPATIENT)
Facility: HOSPITAL | Age: 65
Setting detail: RECURRING SERIES
Discharge: HOME | End: 2025-06-26
Payer: COMMERCIAL

## 2025-06-23 PROCEDURE — 97112 NEUROMUSCULAR REEDUCATION: CPT

## 2025-06-23 PROCEDURE — 97110 THERAPEUTIC EXERCISES: CPT

## 2025-06-23 PROCEDURE — 97530 THERAPEUTIC ACTIVITIES: CPT

## 2025-06-23 NOTE — PROGRESS NOTES
PHYSICAL / OCCUPATIONAL THERAPY - DAILY TREATMENT NOTE    Patient Name: Ganga Schroeder    Date: 2025    : 1960  Insurance: Payor: [employer] / Plan: [employer] - CHOICE PLUS / Product Type: *No Product type* /      Patient  verified Yes     Visit #   Current / Total 6 13   Time   In / Out 222 300   Pain   In / Out 5/10 4/10   Subjective Functional Status/Changes: Pt reports increased LBP today. States he was moving over the weekend and had to do lots of lifting.     TREATMENT AREA =  Intervertebral disc disorders with radiculopathy, lumbosacral region  Other low back pain    OBJECTIVE         Therapeutic Procedures:    Tx Min Billable or 1:1 Min (if diff from Tx Min) Procedure, Rationale, Specifics   18  99406 Therapeutic Exercise (timed):  increase ROM, strength, coordination, balance, and proprioception to improve patient's ability to progress to PLOF and address remaining functional goals. (see flow sheet as applicable)     Details if applicable:       8  39818 Therapeutic Activity (timed):  use of dynamic activities replicating functional movements to increase ROM, strength, coordination, balance, and proprioception in order to improve patient's ability to progress to PLOF and address remaining functional goals.  (see flow sheet as applicable)     Details if applicable:     12  37176 Neuromuscular Re-Education (timed):  improve balance, coordination, kinesthetic sense, posture, core stability and proprioception to improve patient's ability to develop conscious control of individual muscles and awareness of position of extremities in order to progress to PLOF and address remaining functional goals. (see flow sheet as applicable)     Details if applicable:  postural re-ed, balance activities          Details if applicable:            Details if applicable:     38  [facility] Totals Reminder: bill using total billable min of TIMED therapeutic procedures (example: do not include dry

## 2025-06-25 ENCOUNTER — HOSPITAL ENCOUNTER (OUTPATIENT)
Facility: HOSPITAL | Age: 65
Setting detail: RECURRING SERIES
End: 2025-06-25
Payer: COMMERCIAL

## 2025-07-01 ENCOUNTER — HOSPITAL ENCOUNTER (OUTPATIENT)
Facility: HOSPITAL | Age: 65
Setting detail: RECURRING SERIES
End: 2025-07-01
Payer: COMMERCIAL

## 2025-07-03 ENCOUNTER — HOSPITAL ENCOUNTER (OUTPATIENT)
Facility: HOSPITAL | Age: 65
Setting detail: RECURRING SERIES
Discharge: HOME | End: 2025-07-06
Payer: COMMERCIAL

## 2025-07-03 PROCEDURE — 97112 NEUROMUSCULAR REEDUCATION: CPT

## 2025-07-03 PROCEDURE — 97530 THERAPEUTIC ACTIVITIES: CPT

## 2025-07-03 PROCEDURE — 97110 THERAPEUTIC EXERCISES: CPT

## 2025-07-03 NOTE — PROGRESS NOTES
PHYSICAL / OCCUPATIONAL THERAPY - DAILY TREATMENT NOTE    Patient Name: Ganga Schroeder    Date: 7/3/2025    : 1960  Insurance: Payor: [employer] / Plan: [employer] - CHOICE PLUS / Product Type: *No Product type* /      Patient  verified Yes     Visit #   Current / Total 7 13   Time   In / Out 1100 1139   Pain   In / Out 5/10 5/10   Subjective Functional Status/Changes: Pt reports increased back pain and stiffness today. States he has been doing lots of driving lately which has aggravated his back     TREATMENT AREA =  Intervertebral disc disorders with radiculopathy, lumbosacral region  Other low back pain    OBJECTIVE         Therapeutic Procedures:    Tx Min Billable or 1:1 Min (if diff from Tx Min) Procedure, Rationale, Specifics   18  35158 Therapeutic Exercise (timed):  increase ROM, strength, coordination, balance, and proprioception to improve patient's ability to progress to PLOF and address remaining functional goals. (see flow sheet as applicable)     Details if applicable:       9  68766 Therapeutic Activity (timed):  use of dynamic activities replicating functional movements to increase ROM, strength, coordination, balance, and proprioception in order to improve patient's ability to progress to PLOF and address remaining functional goals.  (see flow sheet as applicable)     Details if applicable:     12  26997 Neuromuscular Re-Education (timed):  improve balance, coordination, kinesthetic sense, posture, core stability and proprioception to improve patient's ability to develop conscious control of individual muscles and awareness of position of extremities in order to progress to PLOF and address remaining functional goals. (see flow sheet as applicable)     Details if applicable:            Details if applicable:            Details if applicable:     39   [facility] Totals Reminder: bill using total billable min of TIMED therapeutic procedures (example: do not include dry needle or

## 2025-07-08 ENCOUNTER — HOSPITAL ENCOUNTER (OUTPATIENT)
Facility: HOSPITAL | Age: 65
Setting detail: RECURRING SERIES
End: 2025-07-08
Payer: COMMERCIAL

## 2025-07-10 ENCOUNTER — HOSPITAL ENCOUNTER (OUTPATIENT)
Facility: HOSPITAL | Age: 65
Setting detail: RECURRING SERIES
Discharge: HOME | End: 2025-07-13
Payer: COMMERCIAL

## 2025-07-10 PROCEDURE — 97530 THERAPEUTIC ACTIVITIES: CPT

## 2025-07-10 PROCEDURE — 97110 THERAPEUTIC EXERCISES: CPT

## 2025-07-10 PROCEDURE — 97112 NEUROMUSCULAR REEDUCATION: CPT

## 2025-07-10 NOTE — PROGRESS NOTES
NATALIE PAPPAS [facility]  [address] [place] [address]  Phone: [phone]   [phone]  PHYSICAL THERAPY PROGRESS NOTE  Patient Name: Ganga Schroeder : 1960   Medical/Treatment Diagnosis: Intervertebral disc disorders with radiculopathy, lumbosacral region  Other low back pain   Referral Source: Toney Reynolds MD     Date of Initial Visit: 2025 Attended Visits: 8 Missed Visits: 7     SUMMARY OF TREATMENT  Physical therapy has consisted of therapeutic exercises for increased core and L/S strength, ROM, and flexibility. Therapeutic activities performed to improve functional activities, model real life movements and performance specific to the patient's need with supervision to return the patient to their PLOF.  Neuromuscular Re-ed performed to improve coordination, improve mm activation, improve proprioception to improve the patient's ability to perform ADL/IADL's. Pt education provided regarding HEP.     CURRENT STATUS  % improvement: 50%  Max pain 6/10  Avg pain 4/10  Min pain 3/10    Current improvements: Decreased pain  Remaining functional limitations: Heavy lifting, decreased sitting tolerance (>=1 hr), decreased walking tolerance    Objective measures:    FUNCTIONAL ASSESSMENT:  TA iso: Good                    ROM   LUMBAR: Flex fingertips to mid-shins, Ext 50%. SB R fingertips to distal thigh, L fingertips to distal thigh.  ROT R 75%, L 75%                  SPECIAL TESTS:  (+) 90-90 Hams R 45, L 25        Oswestry: 28% (24% at IE)      Long Term Goals to be achieved in __4__ WEEKS  1. Pt will improve TA iso to Good for improved axial stability with sitting.  Status at last note/certification: TA iso: Fair (requires heavy cues and difficulty avoiding valsalva)  Current:  7/10/25: Goal met  2. Pt will improve b/l 90-90 hamstrings to </= 30 deg for decreased back pain with functional mobility.  Status at last note/certification:  R 52,

## 2025-07-10 NOTE — PROGRESS NOTES
PHYSICAL / OCCUPATIONAL THERAPY - DAILY TREATMENT NOTE    Patient Name: Ganga Schroeder    Date: 7/10/2025    : 1960  Insurance: Payor: [employer] / Plan: [employer] - CHOICE PLUS / Product Type: *No Product type* /      Patient  verified Yes     Visit #   Current / Total 8 13   Time   In / Out 1100 1134   Pain   In / Out 6/10 4/10   Subjective Functional Status/Changes: See PN     TREATMENT AREA =  Intervertebral disc disorders with radiculopathy, lumbosacral region  Other low back pain    OBJECTIVE         Therapeutic Procedures:    Tx Min Billable or 1:1 Min (if diff from Tx Min) Procedure, Rationale, Specifics   10  [id number] Therapeutic Exercise (timed):  increase ROM, strength, coordination, balance, and proprioception to improve patient's ability to progress to PLOF and address remaining functional goals. (see flow sheet as applicable)     Details if applicable:       16  [id number] Therapeutic Activity (timed):  use of dynamic activities replicating functional movements to increase ROM, strength, coordination, balance, and proprioception in order to improve patient's ability to progress to PLOF and address remaining functional goals.  (see flow sheet as applicable)     Details if applicable:  reassessment, Oswestry   8  [id number] Neuromuscular Re-Education (timed):  improve balance, coordination, kinesthetic sense, posture, core stability and proprioception to improve patient's ability to develop conscious control of individual muscles and awareness of position of extremities in order to progress to PLOF and address remaining functional goals. (see flow sheet as applicable)     Details if applicable:            Details if applicable:            Details if applicable:     34  [facility] Totals Reminder: bill using total billable min of TIMED therapeutic procedures (example: do not include dry needle or estim unattended, both untimed codes, in totals to left)  8-22 min = 1 unit; 23-37 min = 2 units;

## 2025-07-22 ENCOUNTER — HOSPITAL ENCOUNTER (OUTPATIENT)
Facility: HOSPITAL | Age: 65
Setting detail: RECURRING SERIES
End: 2025-07-22
Payer: COMMERCIAL

## 2025-07-24 ENCOUNTER — APPOINTMENT (OUTPATIENT)
Facility: HOSPITAL | Age: 65
End: 2025-07-24
Payer: COMMERCIAL